# Patient Record
Sex: FEMALE | Race: WHITE | NOT HISPANIC OR LATINO | Employment: FULL TIME | URBAN - METROPOLITAN AREA
[De-identification: names, ages, dates, MRNs, and addresses within clinical notes are randomized per-mention and may not be internally consistent; named-entity substitution may affect disease eponyms.]

---

## 2017-01-03 ENCOUNTER — TELEPHONE (OUTPATIENT)
Dept: SURGERY | Facility: CLINIC | Age: 42
End: 2017-01-03

## 2017-01-03 ENCOUNTER — OFFICE VISIT (OUTPATIENT)
Dept: SURGERY | Facility: CLINIC | Age: 42
End: 2017-01-03
Payer: COMMERCIAL

## 2017-01-03 VITALS — TEMPERATURE: 97.3 F | HEIGHT: 61 IN | WEIGHT: 161 LBS | BODY MASS INDEX: 30.4 KG/M2

## 2017-01-03 DIAGNOSIS — R10.11 RUQ PAIN: ICD-10-CM

## 2017-01-03 DIAGNOSIS — E66.9 ADIPOSITY: ICD-10-CM

## 2017-01-03 DIAGNOSIS — K42.9 UMBILICAL HERNIA WITHOUT OBSTRUCTION AND WITHOUT GANGRENE: Primary | ICD-10-CM

## 2017-01-03 DIAGNOSIS — Z83.79 FAMILY HISTORY OF GALLBLADDER DISEASE: ICD-10-CM

## 2017-01-03 PROCEDURE — 99203 OFFICE O/P NEW LOW 30 MIN: CPT | Performed by: SURGERY

## 2017-01-03 NOTE — TELEPHONE ENCOUNTER
Surgery Scheduled    Date of Surgery 03/16/17 Time of Surgery 1:00pm  Procedure: Umbilical hernia repair with mesh plug  Hospital/Surgical Facility: [facility]  Surgeon: Dr. Salgado  Type of Anesthesia : Anesthesia Choice MAC versus general versus spinal  Pre-op 03/06/17 with Dr. Griffin  Post op:04/03/17 with Dr. Salgado        Surgery packet mailed to patient's home address. Patients instructed to arrive 1 hours prior to surgery. Patient understood and agrees to plan.    Hien Pittman  Surgery Scheduler

## 2017-01-03 NOTE — PROGRESS NOTES
[facility] Surgery Consultation    Kristina K Riedel MRN# [id number]   Age: 41 year old YOB: 1975     Date of consultation: 1/3/2017    Reason for consult: Abdominal pain  Umbilical hernia       Requesting physician: Tamara Griffin                       Chief Complaint:   Abdominal pain, right upper quadrant  Umbilical hernia     History is obtained from the patient and review of the chart         History of Present Illness:   This patient is a 41 year old  female with past medical history as noted below, who presents with complaints of a slowly enlarging and progressively more painful umbilical hernia. She also has associated right upper quadrant pain. She feels like she can detect her intestine moving under her rib cage at times. She thinks this is associated with her umbilical hernia. It also seems to be aggravated by eating. There is a family history of gallbladder disease. The patient has not had a prior right upper quadrant ultrasound. She denies nausea or vomiting but does complain of bloating, gassiness and indigestion.    Umbilical hernia:     The bulge near the umbilicus is associated with intermittent tenderness.  Worsening in severity.  She has not had umbilical hernia repair before in the past.  This hernia is reducible.                Past Medical History:     Past Medical History   Diagnosis Date     NVD (normal vaginal delivery)      x3             Past Surgical History:     Past Surgical History   Procedure Laterality Date     Hc tooth extraction w/forcep       [place] tooth extraction             Social History:     Social History   Substance Use Topics     Smoking status: Never Smoker      Smokeless tobacco: Never Used     Alcohol Use: Yes      Comment: once a week 2-3 glasses wine             Family History:     Family History   Problem Relation Age of Onset     Thyroid Disease Paternal Grandmother      Hypertension Father               "Immunizations:     VACCINE/DOSE   Diptheria   DPT   DTAP   HBIG   Hepatitis A   Hepatitis B   HIB   Influenza   Measles   Meningococcal   MMR   Mumps   Pneumococcal   Polio   Rubella   Small Pox   TDAP   Varicella   Zoster             Allergies:   No Known Allergies          Medications:     Current Outpatient Prescriptions   Medication Sig     VITAMIN D PO Take 600 Units by mouth.     Calcium Carbonate-Vitamin D (CALCIUM 500 + D PO) Take  by mouth.     MULTI-VITAMIN/IRON OR TABS 1 tablet daily     ZINC ACETATE (ORAL) OR 1 CAPSULE 3 TIMES DAILY ON EMPTY STOMACH     OMEGA 3 120-180 MG OR CAPS None Entered     No current facility-administered medications for this visit.             Review of Systems:   C: NEGATIVE for fever, chills, change in weight  E/M: NEGATIVE for ear, mouth and throat problems  R: NEGATIVE for significant cough or SOB  CV: NEGATIVE for chest pain, palpitations or peripheral edema  GI: As per history of present illness above          Physical Exam:   All vitals have been reviewed  Patient Vitals for the past 24 hrs:   Temp Temp src Height Weight   01/03/17 0924 97.3  F (36.3  C) Skin 5' 1\" (1.549 m) 161 lb (73.029 kg)         General: Alert and oriented in no obvious distress  Eyes: No scleral icterus  Skin: Warm and dry without diaphoresis or jaundice  Respiratory: Unlabored  Abdomen: Obese, soft without tenderness in right upper quadrant at present. There is a small reducible umbilical hernia. The base of the defect is probably 1.5 cm. There are no overlying skin changes. There are no other masses. There is no guarding rebound or rigidity.  Musculoskeletal: Patient noted to move all extremities normally  Psychiatric: No obvious changes in mood or affect               Data:   All laboratory data reviewed  There is no pertinent lab or imaging data to review.              Assessment and Plan:   Assessment: Small reducible umbilical hernia. Patient is also interested in liposuction  simultaneously " with her umbilical hernia repair and she is aware this is not available on site at [facility]. She does request referral to investigate possibility of combined surgery. She is given Dr. De La Cruz's phone number at [place] [phone]. She could also have her umbilical hernia repaired by Maria Guadalupe Fuentes, Jessica or Jadon.     Patient Active Problem List    Diagnosis Date Noted     Seasonal allergic rhinitis 05/18/2011     CARDIOVASCULAR SCREENING; LDL GOAL LESS THAN 160 10/31/2010           Plan:    Should the patient proceed with umbilical hernia repair only. We will give her date here at [facility].     The patient was thoroughly counseled regarding her Umbilical hernia without obstruction and without gangrene [K42.9].     The patient was informed that the proposed procedure or medical intervention involves reduction and repair with or without mesh and does offer a very good likelihood of symptom relief.     The patient was made aware of the risks of the procedure, including but not limited to: persistence of pain, injury to the bowel/bladder, hematoma, mesh migration, mesh infection, cardiac or pulmonary complication and anesthesia related complications also that difficulties may be encountered during recovery to include: wound infection, recurrence, seroma, hematoma and chronic pain.     In the course of the evaluation we did discuss other therapeutic options with the patient, including continued watchful waiting. The risks and benefits of these options were also discussed which include but are not limited to: incarceration and/or strangulation..     Also discussed were possible problems or difficulties the patient may encounter if treatment was not pursued at this time.     The patient was informed that Amanda Salgado MD will be primarily responsible for the procedure. Assistance during the procedure and during hospitalization may also be provided by other physicians, nurses and technicians.     The  patient will be provided additional education resources by the support staff. If there are ever any questions regarding their diagnosis or the procedure, the patient is encouraged to ask.     All of the patient s or their legal representative s questions have been answered to their satisfaction and they have indicated a clear understanding of this discussion.   Laya expressed understanding of risks, benefits and alternatives and wished to proceed.     All findings, test results, and diagnosis were discussed with the patient. Laya  participated in the decision making process and agreed with the plan of care. Questions were sought and answered.     Because of her family history of gallbladder disease, I did recommend obtaining a right upper quadrant ultrasound to see if her symptoms were not related to gallstones. Subsequent laparoscopic cholecystectomy would be made more difficult by presence of the mesh and the abdominal wall. Her right upper quadrant symptoms do not seem entirely consistent with her umbilical hernia etiology.             Attestation:  I have reviewed today's vital signs, notes, medications, labs and imaging.  Amount of time performed on this consult: 30 minutes of which greater than 50% was spent in counseling and coordination of care.    Amanda Salgado MD         Please schedule for surgery, pre op H&P, and post ops.    Surgery:  Patient Name:  Kristina K Riedel ([id number])  Procedure:   Umbilical hernia repair with mesh plug  Diagnosis:    Umbilical hernia   Assistant: Single scrub tech  Surgeon:  Amanda Salgado MD  Anesthesia:  Anesthesia choice MAC versus general versus spinal  PT type:  Same Day Surgery  Time needed: 60 minutes  Patient position:  lying supine  Mini fluoro:  No  C-arm:  no  Equipment:  Mesh plug such as Bard ventralex  Anticoagulation:  No  Vendor:  no  Surgeon Notes: Small umbilical hernia. May choose to cancel surgery if he decides to pursue  liposuction.    Post op appts:    12-15 days po    Expected work restrictions:  10 pound lifting, pulling, pushing restriction for 4 weeks postop    [facility] Discussed:  NO

## 2017-01-03 NOTE — NURSING NOTE
Patient prefers to go to [facility] for imaging as her sister works there. Writer gave patient the order so she can schedule and instructed her to bring back the imaging on the disk as well as the report for Dr. Salgado to go over. Patient verbalized understanding and had no questions.  Giovanni ESTRADA CMA

## 2017-01-03 NOTE — NURSING NOTE
"Chief Complaint   Patient presents with     Consult     hernia       Initial Temp(Src) 97.3  F (36.3  C) (Skin)  Ht 5' 1\" (1.549 m)  Wt 161 lb (73.029 kg)  BMI 30.44 kg/m2 Estimated body mass index is 30.44 kg/(m^2) as calculated from the following:    Height as of this encounter: 5' 1\" (1.549 m).    Weight as of this encounter: 161 lb (73.029 kg).  BP completed using cuff size: NA (Not Taken)  Giovanni ESTRADA CMA      "

## 2017-01-03 NOTE — MR AVS SNAPSHOT
After Visit Summary   1/3/2017    Kristina K Riedel    MRN: [id number]           Patient Information     Date Of Birth          1975        Visit Information        Provider Department      1/3/2017 9:30 AM Amanda Salgado MD [facility]        Today's Diagnoses     Umbilical hernia without obstruction and without gangrene    -  1     Adiposity            Follow-ups after your visit        Additional Services     PLASTIC SURGERY REFERRAL       Your provider has referred you to: [facility]: Plastic and Reconstructive Surgery [facility] [phone]   [email or web address]  Patient interested in liposuction.    Please be aware that coverage of these services is subject to the terms and limitations of your health insurance plan.  Call member services at your health plan with any benefit or coverage questions.      Please bring the following with you to your appointment:    (1) Any X-Rays, CTs or MRIs which have been performed.  Contact the facility where they were done to arrange for  prior to your scheduled appointment.    (2) List of current medications  (3) This referral request   (4) Any documents/labs given to you for this referral                  Who to contact     If you have questions or need follow up information about today's clinic visit or your schedule please contact [facility] directly at [phone].  Normal or non-critical lab and imaging results will be communicated to you by MyChart, letter or phone within 4 business days after the clinic has received the results. If you do not hear from us within 7 days, please contact the clinic through MyChart or phone. If you have a critical or abnormal lab result, we will notify you by phone as soon as possible.  Submit refill requests through [employer] or call your pharmacy and they will forward the refill request to us. Please  "allow 3 business days for your refill to be completed.          Additional Information About Your Visit        [employer] Information     [employer] gives you secure access to your electronic health record. If you see a primary care provider, you can also send messages to your care team and make appointments. If you have questions, please call your primary care clinic.  If you do not have a primary care provider, please call [phone] and they will assist you.        Your Vitals Were     Temperature Height BMI (Body Mass Index)             97.3  F (36.3  C) (Skin) 5' 1\" (1.549 m) 30.44 kg/m2          Blood Pressure from Last 3 Encounters:   07/01/15 110/70   06/03/14 102/72   11/07/12 109/72    Weight from Last 3 Encounters:   01/03/17 161 lb (73.029 kg)   07/01/15 145 lb 1.9 oz (65.826 kg)   06/03/14 143 lb (64.864 kg)              We Performed the Following     PLASTIC SURGERY REFERRAL        Primary Care Provider Office Phone # Fax #    Tamara Gilma Griffin [phone]       [facility] [address] [place] [address]        Thank you!     Thank you for choosing [facility]  for your care. Our goal is always to provide you with excellent care. Hearing back from our patients is one way we can continue to improve our services. Please take a few minutes to complete the written survey that you may receive in the mail after your visit with us. Thank you!             Your Updated Medication List - Protect others around you: Learn how to safely use, store and throw away your medicines at www.disposemymeds.org.          This list is accurate as of: 1/3/17  9:42 AM.  Always use your most recent med list.                   Brand Name Dispense Instructions for use    CALCIUM 500 + D PO      Take  by mouth.       MULTI-vitamin  /IRON Tabs      1 tablet daily       OMEGA 3 120-180 MG Caps      None Entered       VITAMIN D PO      Take 600 Units by mouth.       ZINC ACETATE " (ORAL) PO      1 CAPSULE 3 TIMES DAILY ON EMPTY STOMACH

## 2017-01-05 ENCOUNTER — TELEPHONE (OUTPATIENT)
Dept: SURGERY | Facility: CLINIC | Age: 42
End: 2017-01-05

## 2017-01-05 NOTE — TELEPHONE ENCOUNTER
Patient had questions regarding having hernia repair surgery done at the same time as liposuction. Told her the general prices and told her a consult would be needed.

## 2017-01-06 ENCOUNTER — TRANSFERRED RECORDS (OUTPATIENT)
Dept: HEALTH INFORMATION MANAGEMENT | Facility: CLINIC | Age: 42
End: 2017-01-06

## 2017-01-10 ENCOUNTER — TELEPHONE (OUTPATIENT)
Dept: SURGERY | Facility: CLINIC | Age: 42
End: 2017-01-10

## 2017-01-10 NOTE — TELEPHONE ENCOUNTER
Reason for Call:  Other call back    Detailed comments: US results    Phone Number Patient can be reached at: Home number on file [phone] (home)    Best Time: Patient called asking if US results have been received / and if she needs and appointment to review it.  Please advise    Can we leave a detailed message on this number? YES    Call taken on 1/10/2017 at 1:25 PM by Kitty Lamar

## 2017-01-10 NOTE — TELEPHONE ENCOUNTER
Pt had US at [facility] in [place]. She will call them to have them send the results to us today. SUSAN Olson

## 2017-01-13 ENCOUNTER — TELEPHONE (OUTPATIENT)
Dept: SURGERY | Facility: CLINIC | Age: 42
End: 2017-01-13

## 2017-01-13 NOTE — TELEPHONE ENCOUNTER
Our goal is to have forms completed with 72 hours, however some forms may require a visit or additional information.    Who is the form from?: Insurance comp  Where the form came from: form was faxed in  What clinic location was the form placed at?: [place]  Where the form was placed: '[facility]  What number is listed as a contact on the form?: [phone]    Phone call message- patient request for a letter, form or note:    Date needed: at your convenience  Please fax to [phone]  Has the patient signed a consent form for release of information? YES    Additional comments:     Call taken on 1/13/2017 at 4:24 PM by Kitty Lamar    Type of letter, form or note: disability

## 2017-02-17 NOTE — TELEPHONE ENCOUNTER
Patient called regarding Dr. Salgado on leave and her upcoming 3/30 surgery. Patient would like to remain on Dr. Lance schedule in hopes she returns prior to surgery date.  If not she is interested in scheduling with Dr. Magan Conley.  She said she will check in frequently and I informed we will call her with any updates also.

## 2017-03-13 ENCOUNTER — OFFICE VISIT (OUTPATIENT)
Dept: FAMILY MEDICINE | Facility: CLINIC | Age: 42
End: 2017-03-13
Payer: COMMERCIAL

## 2017-03-13 VITALS
HEIGHT: 61 IN | HEART RATE: 94 BPM | BODY MASS INDEX: 31.06 KG/M2 | DIASTOLIC BLOOD PRESSURE: 82 MMHG | SYSTOLIC BLOOD PRESSURE: 126 MMHG | WEIGHT: 164.5 LBS | TEMPERATURE: 97.1 F | OXYGEN SATURATION: 100 %

## 2017-03-13 DIAGNOSIS — Z00.00 ROUTINE GENERAL MEDICAL EXAMINATION AT A HEALTH CARE FACILITY: Primary | ICD-10-CM

## 2017-03-13 DIAGNOSIS — Z13.6 CARDIOVASCULAR SCREENING; LDL GOAL LESS THAN 160: ICD-10-CM

## 2017-03-13 DIAGNOSIS — K42.9 UMBILICAL HERNIA WITHOUT OBSTRUCTION AND WITHOUT GANGRENE: ICD-10-CM

## 2017-03-13 DIAGNOSIS — Z83.49 FAMILY HISTORY OF THYROID DISEASE: ICD-10-CM

## 2017-03-13 DIAGNOSIS — Z01.818 PREOP GENERAL PHYSICAL EXAM: ICD-10-CM

## 2017-03-13 PROCEDURE — 99396 PREV VISIT EST AGE 40-64: CPT | Performed by: FAMILY MEDICINE

## 2017-03-13 RX ORDER — VITAMIN E 268 MG
400 CAPSULE ORAL DAILY
COMMUNITY

## 2017-03-13 ASSESSMENT — PAIN SCALES - GENERAL: PAINLEVEL: NO PAIN (0)

## 2017-03-13 NOTE — PROGRESS NOTES
[address] [address]  [phone]  Dept: [phone]    Laya is here today for routine preventive exam.  She also needs pre-op clearance for an upcoming hernia repair surgery.   She has no concerns today. She is feeling well.      Preop Evaluation  Today's date: 3/13/2017    Kristina K Riedel (: 1975) presents for a preventative exam as requested by Dr. Amanda Salgado.  She requires evaluation and anesthesia risk assessment prior to undergoing surgery/procedure for treatment of umbilical hernia .  Proposed procedure: Umbilical Herniorrhaphy with possible mesh plug.     Date of Surgery/ Procedure: 3/30/2017  Time of Surgery/ Procedure: 12:00pm  Hospital/Surgical Facility: [facility]  Primary Physician: Tamara Griffin  Type of Anesthesia Anticipated: to be determined    Patient has a Health Care Directive or Living Will:  YES On File    1. NO - Do you have a history of heart attack, stroke, stent, bypass or surgery on an artery in the head, neck, heart or legs?  2. NO - Do you ever have any pain or discomfort in your chest?  3. NO - Do you have a history of  Heart Failure?  4. NO - Are you troubled by shortness of breath when: walking on the level, up a slight hill or at night?  5. NO - Do you currently have a cold, bronchitis or other respiratory infection?  6. NO - Do you have a cough, shortness of breath or wheezing?  7. NO - Do you sometimes get pains in the calves of your legs when you walk?  8. NO - Do you or anyone in your family have previous history of blood clots?  9. NO - Do you or does anyone in your family have a serious bleeding problem such as prolonged bleeding following surgeries or cuts?  10. NO - Have you ever had problems with anemia or been told to take iron pills?  11. NO - Have you had any abnormal blood loss such as black, tarry or bloody stools, or abnormal vaginal bleeding?  12. NO - Have you ever had a blood  "transfusion?  13. NO - Have you or any of your relatives ever had problems with anesthesia?  14. NO - Do you have sleep apnea, excessive snoring or daytime drowsiness?  15. NO - Do you have any prosthetic heart valves?  16. NO - Do you have prosthetic joints?  17. NO - Is there any chance that you may be pregnant?      HPI:                                                      Brief HPI related to upcoming procedure: S:  Kristina K Riedel is a 41 year old female who presents to the clinic for a preventative exam. She has a Umbilical herniorrhaphy scheduled to treat an umbilical hernia, on 3/30 with Dr. Amanda Salgado. Patient last saw Dr. Salgado on 1/3/2017, who provided a history of her hernia:    \"This patient is a 41 year old  female with past medical history as noted below, who presents with complaints of a slowly enlarging and progressively more painful umbilical hernia. She also has associated right upper quadrant pain. She feels like she can detect her intestine moving under her rib cage at times. She thinks this is associated with her umbilical hernia. It also seems to be aggravated by eating. There is a family history of gallbladder disease. The patient has not had a prior right upper quadrant ultrasound. She denies nausea or vomiting but does complain of bloating, gassiness and indigestion.\"    See problem list for active medical problems.  Problems all longstanding and stable, except as noted/documented.  See ROS for pertinent symptoms related to these conditions.                                                                                                  .    MEDICAL HISTORY:                                                      Patient Active Problem List    Diagnosis Date Noted     Seasonal allergic rhinitis 05/18/2011     CARDIOVASCULAR SCREENING; LDL GOAL LESS THAN 160 10/31/2010      Past Medical History   Diagnosis Date     NVD (normal vaginal delivery)      x3     Past Surgical " "History   Procedure Laterality Date     Hc tooth extraction w/forcep       [place] tooth extraction     Current Outpatient Prescriptions   Medication Sig Dispense Refill     vitamin E 400 UNIT capsule Take 400 Units by mouth daily       VITAMIN D PO Take 600 Units by mouth.       Calcium Carbonate-Vitamin D (CALCIUM 500 + D PO) Take  by mouth.       ZINC ACETATE (ORAL) OR 1 CAPSULE 3 TIMES DAILY ON EMPTY STOMACH       MULTI-VITAMIN/IRON OR TABS 1 tablet daily       OMEGA 3 120-180 MG OR CAPS None Entered       OTC products: None, except as noted above    No Known Allergies   Latex Allergy: NO    Social History   Substance Use Topics     Smoking status: Never Smoker     Smokeless tobacco: Never Used     Alcohol use Yes      Comment: once a week 2-3 glasses wine     History   Drug Use No       REVIEW OF SYSTEMS:                                                    C: NEGATIVE for fever, chills, change in weight  I: NEGATIVE for worrisome rashes, moles or lesions  E: NEGATIVE for vision changes or irritation  E/M: NEGATIVE for ear, mouth and throat problems  R: NEGATIVE for significant cough or SOB  B: NEGATIVE for masses, tenderness or discharge  CV: NEGATIVE for chest pain, palpitations or peripheral edema  GI: NEGATIVE for nausea, abdominal pain, heartburn, or change in bowel habits  : NEGATIVE for frequency, dysuria, or hematuria  M: NEGATIVE for significant arthralgias or myalgia  N: NEGATIVE for weakness, dizziness or paresthesias  E: Positive for FHx of Thyroid issues. NEGATIVE for temperature intolerance, skin/hair changes  H: NEGATIVE for bleeding problems  P: NEGATIVE for changes in mood or affect    EXAM:                                                    /82 (BP Location: Right arm, Patient Position: Chair, Cuff Size: Adult Regular)  Pulse 94  Temp 97.1  F (36.2  C) (Temporal)  Ht 5' 0.5\" (1.537 m)  Wt 164 lb 8 oz (74.6 kg)  SpO2 100%  BMI 31.6 kg/m2    GENERAL APPEARANCE: healthy, alert and no " distress     EYES: EOMI, PERRL     HENT: ear canals and TM's normal and nose and mouth without ulcers or lesions     NECK: no adenopathy, no asymmetry, masses, or scars and thyroid normal to palpation     RESP: lungs clear to auscultation - no rales, rhonchi or wheezes     CV: regular rates and rhythm, normal S1 S2, no S3 or S4 and no murmur, click or rub     ABDOMEN: Small umbilical hernia noted on exam. There is a palpable defect in the wall, 3 cm across, just around the umbilicus. soft, nontender, no HSM and bowel sounds normal     MS: extremities normal- no gross deformities noted, no evidence of inflammation in joints, FROM in all extremities.     SKIN: no suspicious lesions or rashes     NEURO: Normal strength and tone, sensory exam grossly normal, mentation intact and speech normal     PSYCH: mentation appears normal. and affect normal/bright     LYMPHATICS: No cervical or supraclavicular nodes    DIAGNOSTICS:                                                      Orders Placed This Encounter   Procedures     TSH with free T4 reflex     Lipid Profile with reflex to direct LDL     IMPRESSION:                                                      Reason for surgery/procedure: Umbilical Hernia  Diagnosis/reason for consult: Preventative Exam, Umbilical Herniorrhaphy      ICD-10-CM    1. Routine general medical examination at a health care facility Z00.00    2. Preop general physical exam Z01.818    3. Umbilical hernia without obstruction and without gangrene K42.9    4. CARDIOVASCULAR SCREENING; LDL GOAL LESS THAN 160 Z13.6 Lipid Profile with reflex to direct LDL   5. Family history of thyroid disease Z83.49 TSH with free T4 reflex     The proposed surgical procedure is considered INTERMEDIATE risk.    REVISED CARDIAC RISK INDEX  The patient has the following serious cardiovascular risks for perioperative complications such as (MI, PE, VFib and 3  AV Block):  No serious cardiac risks  INTERPRETATION: 0 risks: Class  I (very low risk - 0.4% complication rate)    The patient has the following additional risks for perioperative complications:  No identified additional risks    RECOMMENDATIONS:                                                        APPROVAL GIVEN to proceed with proposed procedure, without further diagnostic evaluation    - Labs drawn, will notify with results and discuss further evaluation/ treatment if needed at that time.   - Patient deferred her PAP to her visit next year at her request.  Future lab orders placed for screening.        Signed Electronically by: Tamara Griffin MD    Copy of this evaluation report is provided to requesting physician.    [facility] Preop Guidelines

## 2017-03-13 NOTE — MR AVS SNAPSHOT
After Visit Summary   3/13/2017    Kristina K Riedel    MRN: [id number]           Patient Information     Date Of Birth          1975        Visit Information        Provider Department      3/13/2017 2:15 PM Tamara Griffin MD [facility]        Today's Diagnoses     Routine general medical examination at a health care facility    -  1    Preop general physical exam        Umbilical hernia without obstruction and without gangrene        CARDIOVASCULAR SCREENING; LDL GOAL LESS THAN 160        Family history of thyroid disease          Care Instructions      Before Your Surgery      Call your surgeon if there is any change in your health. This includes signs of a cold or flu (such as a sore throat, runny nose, cough, rash or fever).    Do not smoke, drink alcohol or take over the counter medicine (unless your surgeon or primary care doctor tells you to) for the 24 hours before and after surgery.    If you take prescribed drugs: Follow your doctor s orders about which medicines to take and which to stop until after surgery.    Eating and drinking prior to surgery: follow the instructions from your surgeon    Take a shower or bath the night before surgery. Use the soap your surgeon gave you to gently clean your skin. If you do not have soap from your surgeon, use your regular soap. Do not shave or scrub the surgery site.  Wear clean pajamas and have clean sheets on your bed.         Follow-ups after your visit        Your next 10 appointments already scheduled     Mar 22, 2017  3:00 PM CDT   Return Visit with Ines Conley MD   [facility] ([facility]    [address] [place] [id number] [phone]              Who to contact     If you have questions or need follow up information about today's clinic visit or your schedule please contact [facility] directly at [phone].  Normal or non-critical lab and  "imaging results will be communicated to you by MyChart, letter or phone within 4 business days after the clinic has received the results. If you do not hear from us within 7 days, please contact the clinic through [employer] or phone. If you have a critical or abnormal lab result, we will notify you by phone as soon as possible.  Submit refill requests through [employer] or call your pharmacy and they will forward the refill request to us. Please allow 3 business days for your refill to be completed.          Additional Information About Your Visit        [facility] Information     [employer] gives you secure access to your electronic health record. If you see a primary care provider, you can also send messages to your care team and make appointments. If you have questions, please call your primary care clinic.  If you do not have a primary care provider, please call [phone] and they will assist you.        Care EveryWhere ID     This is your Care EveryWhere ID. This could be used by other organizations to access your [place] medical records  [id number]        Your Vitals Were     Pulse Temperature Height Pulse Oximetry BMI (Body Mass Index)       94 97.1  F (36.2  C) (Temporal) 5' 0.5\" (1.537 m) 100% 31.6 kg/m2        Blood Pressure from Last 3 Encounters:   03/13/17 126/82   07/01/15 110/70   06/03/14 102/72    Weight from Last 3 Encounters:   03/13/17 164 lb 8 oz (74.6 kg)   01/03/17 161 lb (73 kg)   07/01/15 145 lb 1.9 oz (65.8 kg)               Primary Care Provider Office Phone # Fax #    Tamara Gilma Griffin [phone]       [facility] [address] [place] [address]        Thank you!     Thank you for choosing [facility]  for your care. Our goal is always to provide you with excellent care. Hearing back from our patients is one way we can continue to improve our services. Please take a few minutes to complete the written survey that you may receive in the " mail after your visit with us. Thank you!             Your Updated Medication List - Protect others around you: Learn how to safely use, store and throw away your medicines at www.disposemymeds.org.          This list is accurate as of: 3/13/17 11:59 PM.  Always use your most recent med list.                   Brand Name Dispense Instructions for use    CALCIUM 500 + D PO      Take  by mouth.       MULTI-vitamin  /IRON Tabs      1 tablet daily       OMEGA 3 120-180 MG Caps      None Entered       VITAMIN D PO      Take 600 Units by mouth.       vitamin E 400 UNIT capsule      Take 400 Units by mouth daily       ZINC ACETATE (ORAL) PO      1 CAPSULE 3 TIMES DAILY ON EMPTY STOMACH

## 2017-03-13 NOTE — TELEPHONE ENCOUNTER
Left message for patient to return call. Dr. Salgado will not be back on time to do this case so patient needs to schedule a consult with another physician.

## 2017-03-13 NOTE — TELEPHONE ENCOUNTER
Spoke to patient today and notified her that Dr. Salgado would not be back on time for her surgery. Patient has been scheduled for a consult with Dr. Conley. Patient upset about having to have another consult and a bill for it. I forwarded patients information on to Martha James so she could review.

## 2017-03-13 NOTE — NURSING NOTE
"Chief Complaint   Patient presents with     Pre-Op Exam       Initial /82 (BP Location: Right arm, Patient Position: Chair, Cuff Size: Adult Regular)  Pulse 94  Temp 97.1  F (36.2  C) (Temporal)  Ht 5' 0.5\" (1.537 m)  Wt 164 lb 8 oz (74.6 kg)  SpO2 100%  BMI 31.6 kg/m2 Estimated body mass index is 31.6 kg/(m^2) as calculated from the following:    Height as of this encounter: 5' 0.5\" (1.537 m).    Weight as of this encounter: 164 lb 8 oz (74.6 kg).  Medication Reconciliation: complete  Meri Garcia MA  "

## 2017-03-14 DIAGNOSIS — Z13.6 CARDIOVASCULAR SCREENING; LDL GOAL LESS THAN 160: ICD-10-CM

## 2017-03-14 DIAGNOSIS — Z83.49 FAMILY HISTORY OF THYROID DISEASE: ICD-10-CM

## 2017-03-14 LAB
CHOLEST SERPL-MCNC: 190 MG/DL
HDLC SERPL-MCNC: 62 MG/DL
LDLC SERPL CALC-MCNC: 94 MG/DL
NONHDLC SERPL-MCNC: 128 MG/DL
TRIGL SERPL-MCNC: 172 MG/DL
TSH SERPL DL<=0.005 MIU/L-ACNC: 3.29 MU/L (ref 0.4–4)

## 2017-03-14 PROCEDURE — 36415 COLL VENOUS BLD VENIPUNCTURE: CPT | Performed by: FAMILY MEDICINE

## 2017-03-14 PROCEDURE — 80061 LIPID PANEL: CPT | Performed by: FAMILY MEDICINE

## 2017-03-14 PROCEDURE — 84443 ASSAY THYROID STIM HORMONE: CPT | Performed by: FAMILY MEDICINE

## 2017-03-17 PROBLEM — K42.9 UMBILICAL HERNIA WITHOUT OBSTRUCTION AND WITHOUT GANGRENE: Status: ACTIVE | Noted: 2017-03-17

## 2017-03-17 NOTE — PROGRESS NOTES
Laya, these are good. Your thyroid is normal and your cholesterol is fine for a nonfasting test.   Tamara Griffin MD

## 2017-03-22 ENCOUNTER — OFFICE VISIT (OUTPATIENT)
Dept: SURGERY | Facility: CLINIC | Age: 42
End: 2017-03-22
Payer: COMMERCIAL

## 2017-03-22 ENCOUNTER — TELEPHONE (OUTPATIENT)
Dept: SURGERY | Facility: CLINIC | Age: 42
End: 2017-03-22

## 2017-03-22 VITALS — DIASTOLIC BLOOD PRESSURE: 68 MMHG | TEMPERATURE: 97.9 F | HEART RATE: 52 BPM | SYSTOLIC BLOOD PRESSURE: 124 MMHG

## 2017-03-22 DIAGNOSIS — K42.9 UMBILICAL HERNIA WITHOUT OBSTRUCTION AND WITHOUT GANGRENE: Primary | ICD-10-CM

## 2017-03-22 PROCEDURE — 99214 OFFICE O/P EST MOD 30 MIN: CPT | Performed by: SURGERY

## 2017-03-22 RX ORDER — CEFAZOLIN SODIUM 1 G/3ML
1 INJECTION, POWDER, FOR SOLUTION INTRAMUSCULAR; INTRAVENOUS SEE ADMIN INSTRUCTIONS
Status: CANCELLED | OUTPATIENT
Start: 2017-03-22

## 2017-03-22 RX ORDER — CEFAZOLIN SODIUM 2 G/100ML
2 INJECTION, SOLUTION INTRAVENOUS
Status: CANCELLED | OUTPATIENT
Start: 2017-03-22

## 2017-03-22 RX ORDER — ACETAMINOPHEN 325 MG/1
975 TABLET ORAL ONCE
Status: CANCELLED | OUTPATIENT
Start: 2017-03-22 | End: 2017-03-22

## 2017-03-22 NOTE — TELEPHONE ENCOUNTER
Reason for Call:  Other call back    Detailed comments: Dr. Conley patient, would like to schedule her surgery -     Phone Number Patient can be reached at: Home number on file [phone] (home)    Best Time: today please     Can we leave a detailed message on this number? Not Applicable    Call taken on 3/22/2017 at 3:31 PM by Jacquelin Veras

## 2017-03-22 NOTE — PROGRESS NOTES
Assessment:  Primary, reducible umbilical hernia.    Plan:      We have discussed open umbilical hernia repair +/- mesh in detail, including benefits, alternatives, complications, incision, scar, mesh, infection, anesthesia, bleeding, blood transfusion, DVT, PE, hernia recurrence, lifting and activity limits after surgery.  All questions have been answered to the best of my ability.    She has been given literature to review.   We will schedule surgery at the patient's convenience.      HPI:  Laya is a 41 year old female who presents for evaluation of a lump in the umbilical.  She first noticed it 1 year ago.  She complains of intermittent pain with bending.  Negative for associated symptoms of nausea, vomiting, diarrhea and constipation.  She has not had a previous herniorrhaphy in this location.  Her employment does require heavy lifting. She was scheduled for surgery with Damian who is unavailable to do surgery.    Constipation: No  Colonoscopy: No   Cough: No  Diabetes: No  Current Smoker: No    Past Medical History:   has a past medical history of NVD (normal vaginal delivery).    Past Surgical History:  Past Surgical History:   Procedure Laterality Date     HC TOOTH EXTRACTION W/FORCEP      [place] tooth extraction      Additional abdominal operations: none    Social History:  Social History     Social History     Marital status:      Spouse name: Eddie     Number of children: 3     Years of education: N/A     Occupational History      [employer]     Social History Main Topics     Smoking status: Never Smoker     Smokeless tobacco: Never Used     Alcohol use Yes      Comment: once a week 2-3 glasses wine     Drug use: No     Sexual activity: Yes     Partners: Male     Birth control/ protection: Surgical      Comment: vasectomy     Other Topics Concern      Service No     Blood Transfusions No     Caffeine Concern No     Occupational Exposure No     Hobby Hazards No     Sleep Concern  No     Stress Concern No     Weight Concern No     Special Diet No     Back Care No     Exercise Yes     Walks and arobics     Bike Helmet Yes     Seat Belt Yes     Self-Exams Yes     Social History Narrative        Family History:  Family History   Problem Relation Age of Onset     Hypertension Father      Thyroid Disease Paternal Grandmother      Hernias: No    ROS:  The 10 point review of systems is negative other than noted in the HPI and above.    PE:    /68 (BP Location: Right arm, Patient Position: Chair, Cuff Size: Adult Regular)  Pulse 52  Temp 97.9  F (36.6  C) (Skin)  General - Well developed, well nourished female in no apparent distress  Heart: Regular rate and rhythm, no murmurs  Abdomen:  abdomen is soft without significant tenderness, masses, organomegaly or guarding   Hernia:  umbilical, 1 cm, reducible, mildly tender  Extremities: Warm without edema  Musculoskeletal:  Normal station and gait  Neurologic: Nonfocal  Psychiatric: Mood and affect appropriate  Skin: Without lesions or rashes, or juandice    Time spent with the patient with greater that 50% of the time in discussion was 30 minutes.     Magan Conley MD    Please route or send letter to:  Referring Provider

## 2017-03-22 NOTE — NURSING NOTE
"Chief Complaint   Patient presents with     Consult     umbilical hernia, pre-op done on 03/13/17 by Elias        Initial /68 (BP Location: Right arm, Patient Position: Chair, Cuff Size: Adult Regular)  Pulse 52  Temp 97.9  F (36.6  C) (Skin) Estimated body mass index is 31.6 kg/(m^2) as calculated from the following:    Height as of 3/13/17: 5' 0.5\" (1.537 m).    Weight as of 3/13/17: 164 lb 8 oz (74.6 kg).  Medication Reconciliation: complete   Giovanni ESTRADA CMA      "

## 2017-03-22 NOTE — TELEPHONE ENCOUNTER
Surgery Scheduled    Date of Surgery 4/3/17 Time of Surgery   Procedure: Umbilical hernia repair  Hospital/Surgical Facility: [facility]  Surgeon: Dr. Conley  Type of Anesthesia : General  Pre-op 3/13/17 with Dr. Griffin  Post op:4/21/17 with Dr. Conley        Surgery packet mailed to patient's home address. Patients instructed to arrive 1 hours prior to surgery. Patient understood and agrees to plan.    Hien Pittman  Surgery Scheduler

## 2017-03-22 NOTE — Clinical Note
Request for Elective Surgery to be Scheduled Dr. Magan Conley Please schedule for surgery, pre-op H&P, and post-op follow up. Patient Name:   Kristina K Riedel ([id number]). Diagnosis:  Umbilical hernia without obstruction and without gangrene [K42.9]. Procedures:   Umbilical hernia repair.  Patient type:  Outpatient. Time estimate:  60 min. Assistant:   Surgical Tech Anesthesia:  general anesthesia.     Patient position:  supine.                     Pathology Service Needed Routine. Block type: None. Given by: NA. Location where block to be administered:  NA. C-arm:  No. Special Equipment:  None Vendor to be called by O.R.:   No. Patient on anti-coagulation?:  Yes.             Blood or Blood Products available   No.                  Post op appointment:  3-4 weeks. Expected work restrictions:  10-20 lbs for 2-3 weeks.

## 2017-03-22 NOTE — MR AVS SNAPSHOT
After Visit Summary   3/22/2017    Kristina K Riedel    MRN: [id number]           Patient Information     Date Of Birth          1975        Visit Information        Provider Department      3/22/2017 3:00 PM Ines Conley MD [facility]         Follow-ups after your visit        Who to contact     If you have questions or need follow up information about today's clinic visit or your schedule please contact [facility] directly at [phone].  Normal or non-critical lab and imaging results will be communicated to you by MyChart, letter or phone within 4 business days after the clinic has received the results. If you do not hear from us within 7 days, please contact the clinic through [employer] or phone. If you have a critical or abnormal lab result, we will notify you by phone as soon as possible.  Submit refill requests through [employer] or call your pharmacy and they will forward the refill request to us. Please allow 3 business days for your refill to be completed.          Additional Information About Your Visit        MyChart Information     [employer] gives you secure access to your electronic health record. If you see a primary care provider, you can also send messages to your care team and make appointments. If you have questions, please call your primary care clinic.  If you do not have a primary care provider, please call [phone] and they will assist you.        Care EveryWhere ID     This is your Care EveryWhere ID. This could be used by other organizations to access your [place] medical records  [id number]        Your Vitals Were     Pulse Temperature                52 97.9  F (36.6  C) (Skin)           Blood Pressure from Last 3 Encounters:   03/22/17 124/68   03/13/17 126/82   07/01/15 110/70    Weight from Last 3 Encounters:   03/13/17 164 lb 8 oz (74.6 kg)   01/03/17 161 lb (73 kg)   07/01/15 145 lb 1.9 oz (65.8 kg)              Today, you had the  following     No orders found for display       Primary Care Provider Office Phone # Fax #    Tamara Gilma Griffin [phone]       [facility] [address] [place] [address]        Thank you!     Thank you for choosing [facility]  for your care. Our goal is always to provide you with excellent care. Hearing back from our patients is one way we can continue to improve our services. Please take a few minutes to complete the written survey that you may receive in the mail after your visit with us. Thank you!             Your Updated Medication List - Protect others around you: Learn how to safely use, store and throw away your medicines at www.disposemymeds.org.          This list is accurate as of: 3/22/17  3:25 PM.  Always use your most recent med list.                   Brand Name Dispense Instructions for use    CALCIUM 500 + D PO      Take  by mouth.       MULTI-vitamin  /IRON Tabs      1 tablet daily       OMEGA 3 120-180 MG Caps      None Entered       VITAMIN D PO      Take 600 Units by mouth.       vitamin E 400 UNIT capsule      Take 400 Units by mouth daily       ZINC ACETATE (ORAL) PO      1 CAPSULE 3 TIMES DAILY ON EMPTY STOMACH

## 2017-03-24 ENCOUNTER — TELEPHONE (OUTPATIENT)
Dept: SURGERY | Facility: CLINIC | Age: 42
End: 2017-03-24

## 2017-03-30 NOTE — H&P (VIEW-ONLY)
[address] [address]  [phone]  Dept: [phone]    Laya is here today for routine preventive exam.  She also needs pre-op clearance for an upcoming hernia repair surgery.   She has no concerns today. She is feeling well.      Preop Evaluation  Today's date: 3/13/2017    Kristina K Riedel (: 1975) presents for a preventative exam as requested by Dr. Amanda Salgado.  She requires evaluation and anesthesia risk assessment prior to undergoing surgery/procedure for treatment of umbilical hernia .  Proposed procedure: Umbilical Herniorrhaphy with possible mesh plug.     Date of Surgery/ Procedure: 3/30/2017  Time of Surgery/ Procedure: 12:00pm  Hospital/Surgical Facility: [facility]  Primary Physician: Tamara Griffin  Type of Anesthesia Anticipated: to be determined    Patient has a Health Care Directive or Living Will:  YES On File    1. NO - Do you have a history of heart attack, stroke, stent, bypass or surgery on an artery in the head, neck, heart or legs?  2. NO - Do you ever have any pain or discomfort in your chest?  3. NO - Do you have a history of  Heart Failure?  4. NO - Are you troubled by shortness of breath when: walking on the level, up a slight hill or at night?  5. NO - Do you currently have a cold, bronchitis or other respiratory infection?  6. NO - Do you have a cough, shortness of breath or wheezing?  7. NO - Do you sometimes get pains in the calves of your legs when you walk?  8. NO - Do you or anyone in your family have previous history of blood clots?  9. NO - Do you or does anyone in your family have a serious bleeding problem such as prolonged bleeding following surgeries or cuts?  10. NO - Have you ever had problems with anemia or been told to take iron pills?  11. NO - Have you had any abnormal blood loss such as black, tarry or bloody stools, or abnormal vaginal bleeding?  12. NO - Have you ever had a blood  "transfusion?  13. NO - Have you or any of your relatives ever had problems with anesthesia?  14. NO - Do you have sleep apnea, excessive snoring or daytime drowsiness?  15. NO - Do you have any prosthetic heart valves?  16. NO - Do you have prosthetic joints?  17. NO - Is there any chance that you may be pregnant?      HPI:                                                      Brief HPI related to upcoming procedure: S:  Kristina K Riedel is a 41 year old female who presents to the clinic for a preventative exam. She has a Umbilical herniorrhaphy scheduled to treat an umbilical hernia, on 3/30 with Dr. Amanda Salgado. Patient last saw Dr. Salgado on 1/3/2017, who provided a history of her hernia:    \"This patient is a 41 year old  female with past medical history as noted below, who presents with complaints of a slowly enlarging and progressively more painful umbilical hernia. She also has associated right upper quadrant pain. She feels like she can detect her intestine moving under her rib cage at times. She thinks this is associated with her umbilical hernia. It also seems to be aggravated by eating. There is a family history of gallbladder disease. The patient has not had a prior right upper quadrant ultrasound. She denies nausea or vomiting but does complain of bloating, gassiness and indigestion.\"    See problem list for active medical problems.  Problems all longstanding and stable, except as noted/documented.  See ROS for pertinent symptoms related to these conditions.                                                                                                  .    MEDICAL HISTORY:                                                      Patient Active Problem List    Diagnosis Date Noted     Seasonal allergic rhinitis 05/18/2011     CARDIOVASCULAR SCREENING; LDL GOAL LESS THAN 160 10/31/2010      Past Medical History   Diagnosis Date     NVD (normal vaginal delivery)      x3     Past Surgical " "History   Procedure Laterality Date     Hc tooth extraction w/forcep       [place] tooth extraction     Current Outpatient Prescriptions   Medication Sig Dispense Refill     vitamin E 400 UNIT capsule Take 400 Units by mouth daily       VITAMIN D PO Take 600 Units by mouth.       Calcium Carbonate-Vitamin D (CALCIUM 500 + D PO) Take  by mouth.       ZINC ACETATE (ORAL) OR 1 CAPSULE 3 TIMES DAILY ON EMPTY STOMACH       MULTI-VITAMIN/IRON OR TABS 1 tablet daily       OMEGA 3 120-180 MG OR CAPS None Entered       OTC products: None, except as noted above    No Known Allergies   Latex Allergy: NO    Social History   Substance Use Topics     Smoking status: Never Smoker     Smokeless tobacco: Never Used     Alcohol use Yes      Comment: once a week 2-3 glasses wine     History   Drug Use No       REVIEW OF SYSTEMS:                                                    C: NEGATIVE for fever, chills, change in weight  I: NEGATIVE for worrisome rashes, moles or lesions  E: NEGATIVE for vision changes or irritation  E/M: NEGATIVE for ear, mouth and throat problems  R: NEGATIVE for significant cough or SOB  B: NEGATIVE for masses, tenderness or discharge  CV: NEGATIVE for chest pain, palpitations or peripheral edema  GI: NEGATIVE for nausea, abdominal pain, heartburn, or change in bowel habits  : NEGATIVE for frequency, dysuria, or hematuria  M: NEGATIVE for significant arthralgias or myalgia  N: NEGATIVE for weakness, dizziness or paresthesias  E: Positive for FHx of Thyroid issues. NEGATIVE for temperature intolerance, skin/hair changes  H: NEGATIVE for bleeding problems  P: NEGATIVE for changes in mood or affect    EXAM:                                                    /82 (BP Location: Right arm, Patient Position: Chair, Cuff Size: Adult Regular)  Pulse 94  Temp 97.1  F (36.2  C) (Temporal)  Ht 5' 0.5\" (1.537 m)  Wt 164 lb 8 oz (74.6 kg)  SpO2 100%  BMI 31.6 kg/m2    GENERAL APPEARANCE: healthy, alert and no " distress     EYES: EOMI, PERRL     HENT: ear canals and TM's normal and nose and mouth without ulcers or lesions     NECK: no adenopathy, no asymmetry, masses, or scars and thyroid normal to palpation     RESP: lungs clear to auscultation - no rales, rhonchi or wheezes     CV: regular rates and rhythm, normal S1 S2, no S3 or S4 and no murmur, click or rub     ABDOMEN: Small umbilical hernia noted on exam. There is a palpable defect in the wall, 3 cm across, just around the umbilicus. soft, nontender, no HSM and bowel sounds normal     MS: extremities normal- no gross deformities noted, no evidence of inflammation in joints, FROM in all extremities.     SKIN: no suspicious lesions or rashes     NEURO: Normal strength and tone, sensory exam grossly normal, mentation intact and speech normal     PSYCH: mentation appears normal. and affect normal/bright     LYMPHATICS: No cervical or supraclavicular nodes    DIAGNOSTICS:                                                      Orders Placed This Encounter   Procedures     TSH with free T4 reflex     Lipid Profile with reflex to direct LDL     IMPRESSION:                                                      Reason for surgery/procedure: Umbilical Hernia  Diagnosis/reason for consult: Preventative Exam, Umbilical Herniorrhaphy      ICD-10-CM    1. Routine general medical examination at a health care facility Z00.00    2. Preop general physical exam Z01.818    3. Umbilical hernia without obstruction and without gangrene K42.9    4. CARDIOVASCULAR SCREENING; LDL GOAL LESS THAN 160 Z13.6 Lipid Profile with reflex to direct LDL   5. Family history of thyroid disease Z83.49 TSH with free T4 reflex     The proposed surgical procedure is considered INTERMEDIATE risk.    REVISED CARDIAC RISK INDEX  The patient has the following serious cardiovascular risks for perioperative complications such as (MI, PE, VFib and 3  AV Block):  No serious cardiac risks  INTERPRETATION: 0 risks: Class  I (very low risk - 0.4% complication rate)    The patient has the following additional risks for perioperative complications:  No identified additional risks    RECOMMENDATIONS:                                                        APPROVAL GIVEN to proceed with proposed procedure, without further diagnostic evaluation    - Labs drawn, will notify with results and discuss further evaluation/ treatment if needed at that time.   - Patient deferred her PAP to her visit next year at her request.  Future lab orders placed for screening.        Signed Electronically by: Tamara Griffin MD    Copy of this evaluation report is provided to requesting physician.    [place] Preop Guidelines

## 2017-04-03 ENCOUNTER — ANESTHESIA EVENT (OUTPATIENT)
Dept: SURGERY | Facility: CLINIC | Age: 42
End: 2017-04-03
Payer: COMMERCIAL

## 2017-04-03 ENCOUNTER — ANESTHESIA (OUTPATIENT)
Dept: SURGERY | Facility: CLINIC | Age: 42
End: 2017-04-03
Payer: COMMERCIAL

## 2017-04-03 ENCOUNTER — HOSPITAL ENCOUNTER (OUTPATIENT)
Facility: CLINIC | Age: 42
Discharge: HOME | End: 2017-04-03
Attending: SURGERY | Admitting: SURGERY
Payer: COMMERCIAL

## 2017-04-03 VITALS
OXYGEN SATURATION: 98 % | WEIGHT: 164.5 LBS | RESPIRATION RATE: 16 BRPM | SYSTOLIC BLOOD PRESSURE: 112 MMHG | TEMPERATURE: 97.9 F | BODY MASS INDEX: 31.6 KG/M2 | DIASTOLIC BLOOD PRESSURE: 97 MMHG

## 2017-04-03 DIAGNOSIS — K42.9 UMBILICAL HERNIA WITHOUT OBSTRUCTION AND WITHOUT GANGRENE: Primary | ICD-10-CM

## 2017-04-03 LAB — B-HCG SERPL-ACNC: <1 IU/L (ref 0–5)

## 2017-04-03 PROCEDURE — 25000128 H RX IP 250 OP 636: Performed by: SURGERY

## 2017-04-03 PROCEDURE — 40000306 ZZH STATISTIC PRE PROC ASSESS II: Performed by: SURGERY

## 2017-04-03 PROCEDURE — 25000132 ZZH RX MED GY IP 250 OP 250 PS 637: Performed by: SURGERY

## 2017-04-03 PROCEDURE — 71000027 ZZH RECOVERY PHASE 2 EACH 15 MINS: Performed by: SURGERY

## 2017-04-03 PROCEDURE — 36000052 ZZH SURGERY LEVEL 2 EA 15 ADDTL MIN: Performed by: SURGERY

## 2017-04-03 PROCEDURE — 25000128 H RX IP 250 OP 636: Performed by: NURSE ANESTHETIST, CERTIFIED REGISTERED

## 2017-04-03 PROCEDURE — 36000050 ZZH SURGERY LEVEL 2 1ST 30 MIN: Performed by: SURGERY

## 2017-04-03 PROCEDURE — 84702 CHORIONIC GONADOTROPIN TEST: CPT | Performed by: NURSE ANESTHETIST, CERTIFIED REGISTERED

## 2017-04-03 PROCEDURE — 27210794 ZZH OR GENERAL SUPPLY STERILE: Performed by: SURGERY

## 2017-04-03 PROCEDURE — 25000125 ZZHC RX 250: Performed by: NURSE ANESTHETIST, CERTIFIED REGISTERED

## 2017-04-03 PROCEDURE — 27110028 ZZH OR GENERAL SUPPLY NON-STERILE: Performed by: SURGERY

## 2017-04-03 PROCEDURE — 25000125 ZZHC RX 250: Performed by: SURGERY

## 2017-04-03 PROCEDURE — 37000009 ZZH ANESTHESIA TECHNICAL FEE, EACH ADDTL 15 MIN: Performed by: SURGERY

## 2017-04-03 PROCEDURE — 37000008 ZZH ANESTHESIA TECHNICAL FEE, 1ST 30 MIN: Performed by: SURGERY

## 2017-04-03 PROCEDURE — 25000564 ZZH DESFLURANE, EA 15 MIN: Performed by: SURGERY

## 2017-04-03 PROCEDURE — 71000014 ZZH RECOVERY PHASE 1 LEVEL 2 FIRST HR: Performed by: SURGERY

## 2017-04-03 PROCEDURE — 49585 ZZHC REPAIR UMBILICAL HERN,5+Y/O,REDUC: CPT | Performed by: SURGERY

## 2017-04-03 PROCEDURE — 25800025 ZZH RX 258: Performed by: NURSE ANESTHETIST, CERTIFIED REGISTERED

## 2017-04-03 RX ORDER — SODIUM CHLORIDE, SODIUM LACTATE, POTASSIUM CHLORIDE, CALCIUM CHLORIDE 600; 310; 30; 20 MG/100ML; MG/100ML; MG/100ML; MG/100ML
INJECTION, SOLUTION INTRAVENOUS CONTINUOUS
Status: DISCONTINUED | OUTPATIENT
Start: 2017-04-03 | End: 2017-04-03 | Stop reason: HOSPADM

## 2017-04-03 RX ORDER — NALOXONE HYDROCHLORIDE 0.4 MG/ML
.1-.4 INJECTION, SOLUTION INTRAMUSCULAR; INTRAVENOUS; SUBCUTANEOUS
Status: DISCONTINUED | OUTPATIENT
Start: 2017-04-03 | End: 2017-04-03 | Stop reason: HOSPADM

## 2017-04-03 RX ORDER — DEXAMETHASONE SODIUM PHOSPHATE 10 MG/ML
INJECTION, SOLUTION INTRAMUSCULAR; INTRAVENOUS PRN
Status: DISCONTINUED | OUTPATIENT
Start: 2017-04-03 | End: 2017-04-03

## 2017-04-03 RX ORDER — ONDANSETRON 4 MG/1
4 TABLET, ORALLY DISINTEGRATING ORAL EVERY 30 MIN PRN
Status: DISCONTINUED | OUTPATIENT
Start: 2017-04-03 | End: 2017-04-03 | Stop reason: HOSPADM

## 2017-04-03 RX ORDER — LIDOCAINE 40 MG/G
CREAM TOPICAL
Status: DISCONTINUED | OUTPATIENT
Start: 2017-04-03 | End: 2017-04-03 | Stop reason: HOSPADM

## 2017-04-03 RX ORDER — LIDOCAINE HYDROCHLORIDE 20 MG/ML
INJECTION, SOLUTION INFILTRATION; PERINEURAL PRN
Status: DISCONTINUED | OUTPATIENT
Start: 2017-04-03 | End: 2017-04-03

## 2017-04-03 RX ORDER — OXYCODONE HYDROCHLORIDE 5 MG/1
5 TABLET ORAL EVERY 6 HOURS PRN
Qty: 10 TABLET | Refills: 0 | Status: SHIPPED | OUTPATIENT
Start: 2017-04-03 | End: 2017-04-19

## 2017-04-03 RX ORDER — FENTANYL CITRATE 50 UG/ML
INJECTION, SOLUTION INTRAMUSCULAR; INTRAVENOUS PRN
Status: DISCONTINUED | OUTPATIENT
Start: 2017-04-03 | End: 2017-04-03

## 2017-04-03 RX ORDER — FENTANYL CITRATE 50 UG/ML
25-50 INJECTION, SOLUTION INTRAMUSCULAR; INTRAVENOUS
Status: DISCONTINUED | OUTPATIENT
Start: 2017-04-03 | End: 2017-04-03 | Stop reason: HOSPADM

## 2017-04-03 RX ORDER — CEFAZOLIN SODIUM 1 G/3ML
1 INJECTION, POWDER, FOR SOLUTION INTRAMUSCULAR; INTRAVENOUS SEE ADMIN INSTRUCTIONS
Status: DISCONTINUED | OUTPATIENT
Start: 2017-04-03 | End: 2017-04-03 | Stop reason: HOSPADM

## 2017-04-03 RX ORDER — ACETAMINOPHEN 325 MG/1
975 TABLET ORAL ONCE
Status: COMPLETED | OUTPATIENT
Start: 2017-04-03 | End: 2017-04-03

## 2017-04-03 RX ORDER — PROPOFOL 10 MG/ML
INJECTION, EMULSION INTRAVENOUS PRN
Status: DISCONTINUED | OUTPATIENT
Start: 2017-04-03 | End: 2017-04-03

## 2017-04-03 RX ORDER — CEFAZOLIN SODIUM 2 G/100ML
2 INJECTION, SOLUTION INTRAVENOUS
Status: DISCONTINUED | OUTPATIENT
Start: 2017-04-03 | End: 2017-04-03 | Stop reason: HOSPADM

## 2017-04-03 RX ORDER — OXYCODONE HYDROCHLORIDE 5 MG/1
5-10 TABLET ORAL
Status: COMPLETED | OUTPATIENT
Start: 2017-04-03 | End: 2017-04-03

## 2017-04-03 RX ORDER — DIMENHYDRINATE 50 MG/ML
25 INJECTION, SOLUTION INTRAMUSCULAR; INTRAVENOUS
Status: DISCONTINUED | OUTPATIENT
Start: 2017-04-03 | End: 2017-04-03 | Stop reason: HOSPADM

## 2017-04-03 RX ORDER — ONDANSETRON 2 MG/ML
INJECTION INTRAMUSCULAR; INTRAVENOUS PRN
Status: DISCONTINUED | OUTPATIENT
Start: 2017-04-03 | End: 2017-04-03

## 2017-04-03 RX ORDER — BUPIVACAINE HYDROCHLORIDE AND EPINEPHRINE 2.5; 5 MG/ML; UG/ML
INJECTION, SOLUTION INFILTRATION; PERINEURAL PRN
Status: DISCONTINUED | OUTPATIENT
Start: 2017-04-03 | End: 2017-04-03 | Stop reason: HOSPADM

## 2017-04-03 RX ORDER — MEPERIDINE HYDROCHLORIDE 50 MG/ML
12.5 INJECTION INTRAMUSCULAR; INTRAVENOUS; SUBCUTANEOUS
Status: DISCONTINUED | OUTPATIENT
Start: 2017-04-03 | End: 2017-04-03 | Stop reason: HOSPADM

## 2017-04-03 RX ORDER — ONDANSETRON 2 MG/ML
4 INJECTION INTRAMUSCULAR; INTRAVENOUS EVERY 30 MIN PRN
Status: DISCONTINUED | OUTPATIENT
Start: 2017-04-03 | End: 2017-04-03 | Stop reason: HOSPADM

## 2017-04-03 RX ORDER — KETOROLAC TROMETHAMINE 30 MG/ML
INJECTION, SOLUTION INTRAMUSCULAR; INTRAVENOUS PRN
Status: DISCONTINUED | OUTPATIENT
Start: 2017-04-03 | End: 2017-04-03

## 2017-04-03 RX ADMIN — PROPOFOL 50 MG: 10 INJECTION, EMULSION INTRAVENOUS at 15:12

## 2017-04-03 RX ADMIN — PROPOFOL 150 MG: 10 INJECTION, EMULSION INTRAVENOUS at 15:08

## 2017-04-03 RX ADMIN — ONDANSETRON 4 MG: 2 INJECTION INTRAMUSCULAR; INTRAVENOUS at 15:11

## 2017-04-03 RX ADMIN — MIDAZOLAM HYDROCHLORIDE 2 MG: 1 INJECTION, SOLUTION INTRAMUSCULAR; INTRAVENOUS at 15:03

## 2017-04-03 RX ADMIN — FENTANYL CITRATE 50 MCG: 50 INJECTION, SOLUTION INTRAMUSCULAR; INTRAVENOUS at 15:06

## 2017-04-03 RX ADMIN — SODIUM CHLORIDE, POTASSIUM CHLORIDE, SODIUM LACTATE AND CALCIUM CHLORIDE: 600; 310; 30; 20 INJECTION, SOLUTION INTRAVENOUS at 14:40

## 2017-04-03 RX ADMIN — SODIUM CHLORIDE, POTASSIUM CHLORIDE, SODIUM LACTATE AND CALCIUM CHLORIDE: 600; 310; 30; 20 INJECTION, SOLUTION INTRAVENOUS at 15:36

## 2017-04-03 RX ADMIN — ACETAMINOPHEN 975 MG: 325 TABLET ORAL at 14:12

## 2017-04-03 RX ADMIN — CEFAZOLIN 2 G: 1 INJECTION, POWDER, FOR SOLUTION INTRAMUSCULAR; INTRAVENOUS at 15:12

## 2017-04-03 RX ADMIN — KETOROLAC TROMETHAMINE 30 MG: 30 INJECTION, SOLUTION INTRAMUSCULAR at 15:50

## 2017-04-03 RX ADMIN — LIDOCAINE HYDROCHLORIDE 1 ML: 10 INJECTION, SOLUTION EPIDURAL; INFILTRATION; INTRACAUDAL; PERINEURAL at 14:41

## 2017-04-03 RX ADMIN — FENTANYL CITRATE 50 MCG: 50 INJECTION, SOLUTION INTRAMUSCULAR; INTRAVENOUS at 15:12

## 2017-04-03 RX ADMIN — LIDOCAINE HYDROCHLORIDE 60 MG: 20 INJECTION, SOLUTION INFILTRATION; PERINEURAL at 15:08

## 2017-04-03 RX ADMIN — DEXAMETHASONE SODIUM PHOSPHATE 10 MG: 10 INJECTION, SOLUTION INTRAMUSCULAR; INTRAVENOUS at 15:11

## 2017-04-03 RX ADMIN — OXYCODONE HYDROCHLORIDE 5 MG: 5 TABLET ORAL at 16:38

## 2017-04-03 NOTE — ANESTHESIA PREPROCEDURE EVALUATION
Anesthesia Evaluation     . Pt has had prior anesthetic.            ROS/MED HX    ENT/Pulmonary:  - neg pulmonary ROS     Neurologic:  - neg neurologic ROS     Cardiovascular:  - neg cardiovascular ROS       METS/Exercise Tolerance:     Hematologic:  - neg hematologic  ROS       Musculoskeletal:  - neg musculoskeletal ROS       GI/Hepatic:  - neg GI/hepatic ROS       Renal/Genitourinary:         Endo:  - neg endo ROS       Psychiatric:  - neg psychiatric ROS       Infectious Disease:  - neg infectious disease ROS       Malignancy:      - no malignancy   Other:    - neg other ROS                 Physical Exam  Normal systems: cardiovascular and dental    Airway   Mallampati: I  TM distance: >3 FB  Neck ROM: full    Dental     Cardiovascular   Rhythm and rate: regular and normal      Pulmonary    breath sounds clear to auscultation                    Anesthesia Plan      History & Physical Review  History and physical reviewed and following examination; no interval change.    ASA Status:  2 .    NPO Status:  > 8 hours    Plan for General and LMA with Intravenous and Propofol induction. Maintenance will be Inhalation.    PONV prophylaxis:  Ondansetron (or other 5HT-3) and Dexamethasone or Solumedrol       Postoperative Care  Postoperative pain management:  IV analgesics and Oral pain medications.      Consents  Anesthetic plan, risks, benefits and alternatives discussed with:  Patient..                          .

## 2017-04-03 NOTE — ANESTHESIA POSTPROCEDURE EVALUATION
Patient: Kristina K Riedel    Procedure(s):  umbilical hernia repair - Wound Class: I-Clean    Diagnosis:umbilical hernia without obstruction  Diagnosis Additional Information: No value filed.    Anesthesia Type:  General, LMA    Note:  Anesthesia Post Evaluation    Patient location during evaluation: Phase 2  Patient participation: Able to fully participate in evaluation  Level of consciousness: awake and alert  Pain management: adequate  Airway patency: patent  Cardiovascular status: acceptable  Respiratory status: acceptable  Hydration status: acceptable  PONV: none     Anesthetic complications: None          Last vitals:  Vitals:    04/03/17 1630 04/03/17 1645 04/03/17 1700   BP: 121/74 125/83 (!) 112/97   Resp: 16     Temp:      SpO2: 98%  98%         Electronically Signed By: PRABHAKAR Roque CRNA  April 3, 2017  5:46 PM

## 2017-04-03 NOTE — ANESTHESIA CARE TRANSFER NOTE
Patient: Kristina K Riedel    Procedure(s):  umbilical hernia repair - Wound Class: I-Clean    Diagnosis: umbilical hernia without obstruction  Diagnosis Additional Information: No value filed.    Anesthesia Type:   General, LMA     Note:  Airway :Nasal Cannula  Patient transferred to:PACU        Vitals: (Last set prior to Anesthesia Care Transfer)    CRNA VITALS  4/3/2017 1530 - 4/3/2017 1604      4/3/2017             Pulse: 102    SpO2: 100 %                Electronically Signed By: PRABHAKAR Roque CRNA  April 3, 2017  4:04 PM

## 2017-04-03 NOTE — IP AVS SNAPSHOT
MRN:[id number]                      After Visit Summary   4/3/2017    Kristina K Riedel    MRN: [id number]           Thank you!     Thank you for choosing [facility] for your care. Our goal is always to provide you with excellent care. Hearing back from our patients is one way we can continue to improve our services. Please take a few minutes to complete the written survey that you may receive in the mail after you visit with us. Thank you!        Patient Information     Date Of Birth          1975        About your hospital stay     You were admitted on:  April 3, 2017 You last received care in the:  [facility]    You were discharged on:  April 3, 2017       Who to Call     For medical emergencies, please call 911.  For non-urgent questions about your medical care, please call your primary care provider or clinic, [phone]  For questions related to your surgery, please call your surgery clinic        Attending Provider     Provider Magan Walker MD Surgery       Primary Care Provider Office Phone # Fax #    Tamara Gilma Griffin [phone]       [facility] [address] [place] [address]        After Care Instructions     Diet Instructions       Resume pre-procedure diet            Discharge Instructions       Follow up appointment as instructed by Surgeon and or RN    [facility]    Home Care Following Hernia Repair    Magan Conley MD    Hernia Type:  Umbilical    Care of the Incision:  Remove gauze dressing (if present) after 48 hours.  If surgical glue was used, keep your incision dry for 24 hours.  Then you may shower, but don t submerge under water for at least 2 weeks.  Gently pat your incision dry with a freshly laundered towel.  Do not touch your incision with bare hands or pick at scabs.  Leave your incision open to air.  Cover it only if clothing rubs or irritates it.  Activity:  Gradually increase  your activity.  Walk short distances several times each day and increase the distance as your strength allows.  To promote circulation, do not cross your legs while sitting.  No strenuous lifting or straining for 2-3 weeks.   Do not lift anything over 10-20 pounds until your doctor approves an increase.  Return to work will be determined by the type of work you do and should be discussed with your physician.  Do not drive or operate equipment while taking prescription pain medicines.  You may drive 1 week after surgery if you have stopped taking prescription pain medicines and are pain-free enough to react quickly and make an emergency stop if necessary.    Diet:  Return to the diet you were on before surgery.  Drink plenty of  water.  Avoid foods that cause constipation.    REMEMBER most prescription pain pills cause constipation.  Walking, extra fluids, and increased fiber (fresh fruits and vegetables, etc.) are natural remedies for constipation.  You can also take mineral oil, 1-2 Tablespoons per day.  If still constipated you may try a stool softener such as Colace or Miralax.    Call Your Physician if You Have:  Redness, increased swelling or cloudy drainage from your incision.  A temperature of more than 101 degrees F.  Worsening pain in your incision not relieved by your prescription pain pills and/or a short rest.  Any questions or concerns about your recovery, please call     Business hours [phone]    After hours [phone] Nurse Advice Line (24 hours a day)    Follow-up Care:  Make an appointment 2-3 weeks after your surgery.  Call [phone]            Do not - immerse incision in water for one month       Do not immerse incision in water until sutures removed            Dressing       Keep dressing clean and dry.  Dressing / incisional care as instructed by RN and or Surgeon            No driving or operating machinery       until you are not taking narcotics            No lifting       No  lifting ove10 lbs and no strenuous physical activity for 2 weeks            Shower       No shower for 24 hours post procedure. May shower Postoperative Day (POD)  1                  Your next 10 appointments already scheduled     2017  1:00 PM CDT   Return Visit with Ines Conley MD   [facility] ([facility]    [address] [place] [id number]   [phone]              Further instructions from your care team       [facility]   Adult Discharge Orders & Instructions     For 24 hours after surgery    1. Get plenty of rest.  A responsible adult must stay with you for at least 24 hours after you leave the hospital.   2. Do not drive or use heavy equipment.  If you have weakness or tingling, don't drive or use heavy equipment until this feeling goes away.  3. Do not drink alcohol.  4. Avoid strenuous or risky activities.  Ask for help when climbing stairs.   5. You may feel lightheaded.  IF so, sit for a few minutes before standing.  Have someone help you get up.   6. If you have nausea (feel sick to your stomach): Drink only clear liquids such as apple juice, ginger ale, broth or 7-Up.  Rest may also help.  Be sure to drink enough fluids.  Move to a regular diet as you feel able.  7. You may have a slight fever. Call the doctor if your fever is over 100 F (37.7 C) (taken under the tongue) or lasts longer than 24 hours.  8. You may have a dry mouth, a sore throat, muscle aches or trouble sleeping.  These should go away after 24 hours.  9. Do not make important or legal decisions.   Call your doctor for any of the followin.  Signs of infection (fever, growing tenderness at the surgery site, a large amount of drainage or bleeding, severe pain, foul-smelling drainage, redness, swelling).    2. It has been over 8 to 10 hours since surgery and you are still not able to urinate (pass water).    3.  Headache for over 24 hours.    To contact a nurse  24/7, call __________[phone]______________________________    Pending Results     No orders found from 4/1/2017 to 4/4/2017.            Admission Information     Date & Time Provider Department Dept. Phone    4/3/2017 Ines Conley MD [facility] Phase [phone]      Your Vitals Were     Blood Pressure Temperature Respirations Weight Pulse Oximetry BMI (Body Mass Index)    121/74 97.9  F (36.6  C) (Oral) 16 74.6 kg (164 lb 8 oz) 98% 31.6 kg/m2      MyChart Information     [employer] gives you secure access to your electronic health record. If you see a primary care provider, you can also send messages to your care team and make appointments. If you have questions, please call your primary care clinic.  If you do not have a primary care provider, please call [phone] and they will assist you.        Care EveryWhere ID     This is your Care EveryWhere ID. This could be used by other organizations to access your [facility] medical records  [id number]           Review of your medicines      UNREVIEWED medicines. Ask your doctor about these medicines        Dose / Directions    CALCIUM 500 + D PO        Take  by mouth.   Refills:  0       MULTI-vitamin  /IRON Tabs        1 tablet daily   Refills:  0       OMEGA 3 120-180 MG Caps        None Entered   Refills:  0       VITAMIN D PO        Dose:  600 Units   Take 600 Units by mouth.   Refills:  0       vitamin E 400 UNIT capsule        Dose:  400 Units   Take 400 Units by mouth daily   Refills:  0       ZINC ACETATE (ORAL) PO   Used for:  Viral warts, unspecified        1 CAPSULE 3 TIMES DAILY ON EMPTY STOMACH   Refills:  0         START taking        Dose / Directions    oxyCODONE 5 MG IR tablet   Commonly known as:  ROXICODONE   Used for:  Umbilical hernia without obstruction and without gangrene        Dose:  5 mg   Take 1 tablet (5 mg) by mouth every 6 hours as needed for pain or other (Moderate to Severe)   Quantity:  10 tablet   Refills:  0             Where to get your medicines      Some of these will need a paper prescription and others can be bought over the counter. Ask your nurse if you have questions.     Bring a paper prescription for each of these medications     oxyCODONE 5 MG IR tablet                Protect others around you: Learn how to safely use, store and throw away your medicines at www.disposemymeds.org.             Medication List: This is a list of all your medications and when to take them. Check marks below indicate your daily home schedule. Keep this list as a reference.      Medications           Morning Afternoon Evening Bedtime As Needed    CALCIUM 500 + D PO   Take  by mouth.                                MULTI-vitamin  /IRON Tabs   1 tablet daily                                OMEGA 3 120-180 MG Caps   None Entered                                oxyCODONE 5 MG IR tablet   Commonly known as:  ROXICODONE   Take 1 tablet (5 mg) by mouth every 6 hours as needed for pain or other (Moderate to Severe)   Last time this was given:  5 mg on 4/3/2017  4:38 PM   Next Dose Due:  10:30 PM                                VITAMIN D PO   Take 600 Units by mouth.                                vitamin E 400 UNIT capsule   Take 400 Units by mouth daily                                ZINC ACETATE (ORAL) PO   1 CAPSULE 3 TIMES DAILY ON EMPTY STOMACH

## 2017-04-03 NOTE — IP AVS SNAPSHOT
[facility] Phase II    [address]     [place] [address]    Phone:  [phone]                                       After Visit Summary   4/3/2017    Kristina K Riedel    MRN: [id number]           After Visit Summary Signature Page     I have received my discharge instructions, and my questions have been answered. I have discussed any challenges I see with this plan with the nurse or doctor.    ..........................................................................................................................................  Patient/Patient Representative Signature      ..........................................................................................................................................  Patient Representative Print Name and Relationship to Patient    ..................................................               ................................................  Date                                            Time    ..........................................................................................................................................  Reviewed by Signature/Title    ...................................................              ..............................................  Date                                                            Time

## 2017-04-03 NOTE — DISCHARGE INSTRUCTIONS
[facility]   Adult Discharge Orders & Instructions     For 24 hours after surgery    1. Get plenty of rest.  A responsible adult must stay with you for at least 24 hours after you leave the hospital.   2. Do not drive or use heavy equipment.  If you have weakness or tingling, don't drive or use heavy equipment until this feeling goes away.  3. Do not drink alcohol.  4. Avoid strenuous or risky activities.  Ask for help when climbing stairs.   5. You may feel lightheaded.  IF so, sit for a few minutes before standing.  Have someone help you get up.   6. If you have nausea (feel sick to your stomach): Drink only clear liquids such as apple juice, ginger ale, broth or 7-Up.  Rest may also help.  Be sure to drink enough fluids.  Move to a regular diet as you feel able.  7. You may have a slight fever. Call the doctor if your fever is over 100 F (37.7 C) (taken under the tongue) or lasts longer than 24 hours.  8. You may have a dry mouth, a sore throat, muscle aches or trouble sleeping.  These should go away after 24 hours.  9. Do not make important or legal decisions.   Call your doctor for any of the followin.  Signs of infection (fever, growing tenderness at the surgery site, a large amount of drainage or bleeding, severe pain, foul-smelling drainage, redness, swelling).    2. It has been over 8 to 10 hours since surgery and you are still not able to urinate (pass water).    3.  Headache for over 24 hours.    To contact a nurse , call __________[phone]______________________________

## 2017-04-03 NOTE — OP NOTE
DATE OF PROCEDURE: 4/3/2017      PREOPERATIVE DIAGNOSIS: Umbilical hernia.       POSTOPERATIVE DIAGNOSIS: Umbilical hernia.       OPERATIVE PROCEDURE: Umbilical hernia, primary repair    ANESTHESIA: LMA    FIRST ASSISTANT: Surgical Tech      OPERATIVE FINDINGS: A 1 cm umbilical hernia defect with omentum.       DESCRIPTION OF PROCEDURE: Kristina K Riedel was laid supine. She had an LMA for anesthetic and the abdomen was prepped and draped in sterile fashion. After surgical pause, we proceeded with making a semicircular incision infraumbilically. We went down to the level of the fascia. We then encircled the umbilical stalk and then elevated it from the anterior abdominal fascia to find a 1 cm defect with omental fat. This fat was reduced and care was taken to reduce the adhesions of the omental fat to the anterior abdominal wall so that it would drop back into the abdomen. Once this was complete, we closed the defect with 0 PDS suture. We then reattached the umbilical stalk using 3-0 Vicryl x 2 sutures and then injected the anterior abdominal wall with lidocaine. We then did a subcuticular Vicryl subdermal stitch and then closed the incision with 4-0 Monocryl. A small defect was noted at the based of the umbilicus this was closed with a single 3-0 chromic suture. Glue  were applied. The patient tolerated the procedure well. About 12 mL of local was infiltrated. She was extubated and taken to PACU in stable condition.       ESTIMATED BLOOD LOSS:0 mL.       COMPLICATIONS: None.           ABA BATISTA MD

## 2017-04-03 NOTE — ANESTHESIA CARE TRANSFER NOTE
Patient: Kristina K Riedel    Procedure(s):  umbilical hernia repair - Wound Class: I-Clean    Diagnosis: umbilical hernia without obstruction  Diagnosis Additional Information: No value filed.    Anesthesia Type:   General, LMA     Note:  Airway :Room Air  Patient transferred to:Phase II        Vitals: (Last set prior to Anesthesia Care Transfer)    CRNA VITALS  4/3/2017 1530 - 4/3/2017 1630      4/3/2017             Pulse: 102    SpO2: 100 %                Electronically Signed By: PRABHAKAR Roque CRNA  April 3, 2017  5:46 PM

## 2017-04-19 ENCOUNTER — OFFICE VISIT (OUTPATIENT)
Dept: URGENT CARE | Facility: RETAIL CLINIC | Age: 42
End: 2017-04-19
Payer: COMMERCIAL

## 2017-04-19 VITALS
DIASTOLIC BLOOD PRESSURE: 74 MMHG | TEMPERATURE: 97.9 F | OXYGEN SATURATION: 99 % | HEART RATE: 60 BPM | SYSTOLIC BLOOD PRESSURE: 124 MMHG

## 2017-04-19 DIAGNOSIS — J01.90 ACUTE SINUSITIS WITH SYMPTOMS > 10 DAYS: Primary | ICD-10-CM

## 2017-04-19 PROCEDURE — 99203 OFFICE O/P NEW LOW 30 MIN: CPT | Performed by: PHYSICIAN ASSISTANT

## 2017-04-19 NOTE — PATIENT INSTRUCTIONS
Take antibiotic as directed  Apply warm facial compresses/packs for 5-10 minutes three times daily.  Drink plenty of fluids- 6 to 10 glasses of liquids each day. Rest.  Saline drops or nasal sprays as needed.   May use netti pot as needed. Do not use tap water. May use filtered or distilled water.  Steam treatments or humidifier.  Sudafed (decongestant) for congestion.  Mucinex (guaifenesin) to thin out secretions.  Tylenol or ibuprofen as needed for pain or fever  Follow up at your primary care clinic for increasing pain, high fever, vision changes, worsening symptoms, or no relief from symptoms after 7-10 days.  Please follow up with primary care provider if not improving, worsening or new symptoms or for any adverse reactions to medications.

## 2017-04-19 NOTE — PROGRESS NOTES
Chief Complaint   Patient presents with     Sinus Problem     x 3 days, fever yesterday at 100, sinus headaches and pressure     Cough     14 days, coughing yellow phlegm, cough is getting better     SUBJECTIVE:  Kristina K Riedel is a 41 year old female here with concerns about sinus infection. Has had cold sxs and cough for 2 weeks, cough has improved, now having sinus symptoms worsening over past 3 days.  She states onset of symptoms were 2 week(s) ago.  She has had maxillary, frontal pressure. Course of illness is worsening. Severity moderate  Current and Associated symptoms: nasal congestion, cough , facial pain/pressure, headache and post-nasal drainage, ears plugged  Predisposing factors include HX of sinusitis, last one about 6 yrs ago treated with Augmentin and recent illness. Recent treatment has included: OTC meds    Past Medical History:   Diagnosis Date     NVD (normal vaginal delivery)     x3     Current Outpatient Prescriptions   Medication Sig Dispense Refill     Acetaminophen (TYLENOL PO)        amoxicillin-clavulanate (AUGMENTIN) 875-125 MG per tablet Take 1 tablet by mouth 2 times daily for 10 days 20 tablet 0     vitamin E 400 UNIT capsule Take 400 Units by mouth daily       VITAMIN D PO Take 600 Units by mouth Reported on 4/19/2017       Calcium Carbonate-Vitamin D (CALCIUM 500 + D PO) Take  by mouth.       ZINC ACETATE (ORAL) OR 1 CAPSULE 3 TIMES DAILY ON EMPTY STOMACH       MULTI-VITAMIN/IRON OR TABS 1 tablet daily       OMEGA 3 120-180 MG OR CAPS None Entered        No Known Allergies     History   Smoking Status     Never Smoker   Smokeless Tobacco     Never Used       ROS:  Review of systems negative except as stated above.    OBJECTIVE:  /74 (BP Location: Left arm)  Pulse 60  Temp 97.9  F (36.6  C) (Temporal)  SpO2 99%  Exam:GENERAL APPEARANCE: mildly ill appearing  EYES:  conjunctiva clear  HENT: TMs gray with serous effusion, nasal turbinates erythematous, swollen, rhinorrhea  yellow, oral mucous membranes moist, no erythema noted, maxillary sinus tenderness. Post nasal drainage noted in the pharynx.  NECK: supple, nontender, no lymphadenopathy  RESP: lungs clear to auscultation - no rales, rhonchi or wheezes  CV: regular rates and rhythm, normal S1 S2, no murmur noted  SKIN: no suspicious lesions or rashes    ASSESSMENT:  (J01.90) Acute sinusitis with symptoms > 10 days    PLAN:  amoxicillin-clavulanate (AUGMENTIN) 875-125 MG  per tablet  Take antibiotic as directed  Apply warm facial compresses/packs for 5-10 minutes three times daily.  Drink plenty of fluids- 6 to 10 glasses of liquids each day. Rest.  Saline drops or nasal sprays as needed.   May use netti pot as needed. Do not use tap water. May use filtered or distilled water.  Steam treatments or humidifier.  Sudafed (decongestant) for congestion.  Mucinex (guaifenesin) to thin out secretions.  Tylenol or ibuprofen as needed for pain or fever  Follow up at your primary care clinic for increasing pain, high fever, vision changes, worsening symptoms, or no relief from symptoms after 7-10 days.  Please follow up with primary care provider if not improving, worsening or new symptoms or for any adverse reactions to medications.       Myah Sahni PA-C  [facility]

## 2017-04-19 NOTE — NURSING NOTE
"Chief Complaint   Patient presents with     Sinus Problem     x 3 days, fever yesterday at 100, sinus headaches and pressure     Cough     14 days, coughing yellow phlegm, cough is getting better       Initial /74 (BP Location: Left arm)  Pulse 60  Temp 97.9  F (36.6  C) (Temporal)  SpO2 99% Estimated body mass index is 31.6 kg/(m^2) as calculated from the following:    Height as of 3/13/17: 5' 0.5\" (1.537 m).    Weight as of 4/3/17: 164 lb 8 oz (74.6 kg).  Medication Reconciliation: complete    "

## 2017-04-19 NOTE — MR AVS SNAPSHOT
After Visit Summary   4/19/2017    Kristina K Riedel    MRN: [id number]           Patient Information     Date Of Birth          1975        Visit Information        Provider Department      4/19/2017 9:20 AM Myah Sahni PA-C [facility]        Today's Diagnoses     Acute sinusitis with symptoms > 10 days    -  1      Care Instructions    Take antibiotic as directed  Apply warm facial compresses/packs for 5-10 minutes three times daily.  Drink plenty of fluids- 6 to 10 glasses of liquids each day. Rest.  Saline drops or nasal sprays as needed.   May use netti pot as needed. Do not use tap water. May use filtered or distilled water.  Steam treatments or humidifier.  Sudafed (decongestant) for congestion.  Mucinex (guaifenesin) to thin out secretions.  Tylenol or ibuprofen as needed for pain or fever  Follow up at your primary care clinic for increasing pain, high fever, vision changes, worsening symptoms, or no relief from symptoms after 7-10 days.  Please follow up with primary care provider if not improving, worsening or new symptoms or for any adverse reactions to medications.               Follow-ups after your visit        Your next 10 appointments already scheduled     Apr 21, 2017  1:00 PM CDT   Return Visit with Ines Conley MD   [facility] ([facility])    [address] [place] [id number] [phone]              Who to contact     You can reach your care team any time of the day by calling [phone].  Notification of test results:  If you have an abnormal lab result, we will notify you by phone as soon as possible.         Additional Information About Your Visit        MyChart Information     Topmallt gives you secure access to your electronic health record. If you see a primary care provider, you can also send messages to your care team and make appointments. If you have questions, please call your primary  [facility].  If you do not have a primary care provider, please call [phone] and they will assist you.        Care EveryWhere ID     This is your Care EveryWhere ID. This could be used by other organizations to access your [facility] medical records  [id number]        Your Vitals Were     Pulse Temperature Pulse Oximetry             60 97.9  F (36.6  C) (Temporal) 99%          Blood Pressure from Last 3 Encounters:   04/19/17 124/74   04/03/17 (!) 112/97   03/22/17 124/68    Weight from Last 3 Encounters:   04/03/17 164 lb 8 oz (74.6 kg)   03/13/17 164 lb 8 oz (74.6 kg)   01/03/17 161 lb (73 kg)              Today, you had the following     No orders found for display         Today's Medication Changes          These changes are accurate as of: 4/19/17 10:12 AM.  If you have any questions, ask your nurse or doctor.               Start taking these medicines.        Dose/Directions    amoxicillin-clavulanate 875-125 MG per tablet   Commonly known as:  AUGMENTIN   Used for:  Acute sinusitis with symptoms > 10 days        Dose:  1 tablet   Take 1 tablet by mouth 2 times daily for 10 days   Quantity:  20 tablet   Refills:  0            Where to get your medicines      These medications were sent to [id number] - [place], [place] - [address] [address] [facility]  [address] [facility] [address]     Phone:  [phone]     amoxicillin-clavulanate 875-125 MG per tablet                Primary Care Provider Office Phone # Fax #    Tamara Griffin [phone]       [place] [id number] [place] [address]        Thank you!     Thank you for choosing [facility]  for your care. Our goal is always to provide you with excellent care. Hearing back from our patients is one way we can continue to improve our services. Please take a few minutes to complete the written survey that you may receive in the mail after your visit with us. Thank you!             Your  Updated Medication List - Protect others around you: Learn how to safely use, store and throw away your medicines at www.disposemymeds.org.          This list is accurate as of: 4/19/17 10:12 AM.  Always use your most recent med list.                   Brand Name Dispense Instructions for use    amoxicillin-clavulanate 875-125 MG per tablet    AUGMENTIN    20 tablet    Take 1 tablet by mouth 2 times daily for 10 days       CALCIUM 500 + D PO      Take  by mouth.       MULTI-vitamin  /IRON Tabs      1 tablet daily       OMEGA 3 120-180 MG Caps      None Entered       TYLENOL PO          VITAMIN D PO      Take 600 Units by mouth Reported on 4/19/2017       vitamin E 400 UNIT capsule      Take 400 Units by mouth daily       ZINC ACETATE (ORAL) PO      1 CAPSULE 3 TIMES DAILY ON EMPTY STOMACH

## 2017-04-21 ENCOUNTER — OFFICE VISIT (OUTPATIENT)
Dept: SURGERY | Facility: CLINIC | Age: 42
End: 2017-04-21
Payer: COMMERCIAL

## 2017-04-21 VITALS — TEMPERATURE: 98.1 F | SYSTOLIC BLOOD PRESSURE: 130 MMHG | HEART RATE: 80 BPM | DIASTOLIC BLOOD PRESSURE: 84 MMHG

## 2017-04-21 DIAGNOSIS — Z09 S/P UMBILICAL HERNIA REPAIR, FOLLOW-UP EXAM: Primary | ICD-10-CM

## 2017-04-21 PROCEDURE — 99024 POSTOP FOLLOW-UP VISIT: CPT | Performed by: SURGERY

## 2017-04-21 NOTE — MR AVS SNAPSHOT
After Visit Summary   4/21/2017    Kristina K Riedel    MRN: [id number]           Patient Information     Date Of Birth          1975        Visit Information        Provider Department      4/21/2017 1:00 PM Ines Conley MD [facility]        Today's Diagnoses     S/P umbilical hernia repair, follow-up exam    -  1       Follow-ups after your visit        Who to contact     If you have questions or need follow up information about today's clinic visit or your schedule please contact [facility] directly at [phone].  Normal or non-critical lab and imaging results will be communicated to you by TabSquarehart, letter or phone within 4 business days after the clinic has received the results. If you do not hear from us within 7 days, please contact the clinic through Strataviat or phone. If you have a critical or abnormal lab result, we will notify you by phone as soon as possible.  Submit refill requests through WomStreet or call your pharmacy and they will forward the refill request to us. Please allow 3 business days for your refill to be completed.          Additional Information About Your Visit        MyChart Information     WomStreet gives you secure access to your electronic health record. If you see a primary care provider, you can also send messages to your care team and make appointments. If you have questions, please call your primary care clinic.  If you do not have a primary care provider, please call [phone] and they will assist you.        Care EveryWhere ID     This is your Care EveryWhere ID. This could be used by other organizations to access your [facility] medical records  [id number]        Your Vitals Were     Pulse Temperature                80 98.1  F (36.7  C) (Temporal)           Blood Pressure from Last 3 Encounters:   04/21/17 130/84   04/19/17 124/74   04/03/17 (!) 112/97    Weight from Last 3 Encounters:   04/03/17 164 lb 8 oz (74.6 kg)    03/13/17 164 lb 8 oz (74.6 kg)   01/03/17 161 lb (73 kg)              Today, you had the following     No orders found for display       Primary Care Provider Office Phone # Fax #    Tamara Gilma Griffin [phone]       [facility] [address] [place] [address]        Thank you!     Thank you for choosing [facility]  for your care. Our goal is always to provide you with excellent care. Hearing back from our patients is one way we can continue to improve our services. Please take a few minutes to complete the written survey that you may receive in the mail after your visit with us. Thank you!             Your Updated Medication List - Protect others around you: Learn how to safely use, store and throw away your medicines at www.disposemymeds.org.          This list is accurate as of: 4/21/17  3:01 PM.  Always use your most recent med list.                   Brand Name Dispense Instructions for use    amoxicillin-clavulanate 875-125 MG per tablet    AUGMENTIN    20 tablet    Take 1 tablet by mouth 2 times daily for 10 days       CALCIUM 500 + D PO      Take  by mouth.       MULTI-vitamin  /IRON Tabs      1 tablet daily       OMEGA 3 120-180 MG Caps      None Entered       TYLENOL PO          VITAMIN D PO      Take 600 Units by mouth Reported on 4/19/2017       vitamin E 400 UNIT capsule      Take 400 Units by mouth daily       ZINC ACETATE (ORAL) PO      1 CAPSULE 3 TIMES DAILY ON EMPTY STOMACH

## 2017-04-21 NOTE — PROGRESS NOTES
[facility] NOTE  GENERAL SURGERY    PCP: Tamara Griffin         Subjective:     Kristina K Riedel is a 41 year old female who presents with RECHECK (hernia repair done on 04/03/17)    Pain has been well controlled. Currently is not using pain medications. Eating a regular diet and having regular bladder/bowel function. Overall doing well.         Objective:     /84  Pulse 80  Temp 98.1  F (36.7  C) (Temporal)   Constitutional: Awake, alert, in no acute distress.  Respiratory: Non-labored.   Cardiovascular: Regular rate and rhythm.   Abdomen: Incision is clean and dry without erythema. Healing well         Assessment and Plan:   No diagnosis found.      Kristina K Riedel is a 41 year old female who presented post operatively and is doing very well    Cut a small suture that was visible.   Paper work for disability filled out. Return to work 4/28/17  She may increase her activity.   Follow up with general surgery as needed.      Magan Conley MD  [facility]

## 2017-04-21 NOTE — NURSING NOTE
"Chief Complaint   Patient presents with     RECHECK     hernia repair done on 04/03/17       Initial /84  Pulse 80  Temp 98.1  F (36.7  C) (Temporal) Estimated body mass index is 31.6 kg/(m^2) as calculated from the following:    Height as of 3/13/17: 5' 0.5\" (1.537 m).    Weight as of 4/3/17: 164 lb 8 oz (74.6 kg).  Medication Reconciliation:complete  Giovanni ESTRADA CMA      "

## 2017-05-23 NOTE — TELEPHONE ENCOUNTER
Call attempted, no answer. SUSAN Olson      77 yo male had spinal fusion at [facility] last month, has been in [facility], sent to [facility] 6 times for nausea and back pain not relieved by Zofran PO with PO Dilaudid. Sent by Dr. Springer for admission to regulate pain and nausea meds. Plan Labs, IV fluids, Dilaudid and Zofran IV. Dr. Vazquez to admit and consider inpatient pain mngmnt consult/Detox.

## 2017-08-19 ENCOUNTER — HEALTH MAINTENANCE LETTER (OUTPATIENT)
Age: 42
End: 2017-08-19

## 2017-11-10 NOTE — TELEPHONE ENCOUNTER
Spoke to patient and she states that her medication DILT- mg 24 hr capsule will no longer be covered by her current insurance after the new year and she would like Dr. Kennedy to order an alternative. She is aware Dr. Kennedy is unavailable until next week and will be fine with call back then. Will review with Dr. Kennedy.     Per Dr. Kennedy ask patient what they will pay for.     Patient states they no longer will carry the XR, they will pay for 240 mg CD or 240 SR. She would like a 90 day supply sent to [facility] today and she would like to know which one Dr. Kennedy would probably use so she can let her new insurance know. Will review further with Dr. Kennedy.     Per Dr. Kennedy he will order Diltiazem 240 mg CD when we re-order, patient notified and she will let us know when she needs refill.    Spoke to patient, she rescheduled surgery for 3/30/2017. Antonette in OR notified.

## 2018-01-07 ENCOUNTER — VIRTUAL VISIT (OUTPATIENT)
Dept: FAMILY MEDICINE | Facility: OTHER | Age: 43
End: 2018-01-07

## 2018-01-07 NOTE — PROGRESS NOTES
"Date:   Clinician: Anjel Smith  Clinician NPI: [id number]  Patient: Kristina Riedel  Patient : 1975  Patient Address: [address] [place] [address]  Patient Phone: [phone]  Visit Protocol: URI  Patient Summary:  Laya is a 42 year old ( : 1975 ) female who initiated a Visit for cold, sinus infection, or influenza. When asked the question \"Please sign me up to receive news, health information and promotions. \", Laya responded \"No\".    Laya states her symptoms started gradually 2-3 weeks ago.   Her symptoms consist of myalgia, a headache, facial pain or pressure, nasal congestion, enlarged lymph nodes, malaise, and a cough. Laya also feels feverish.   Symptom Details     Nasal secretions: The color of her mucus is green and yellow.    Cough: Laya coughs almost every minute and her cough is more bothersome at night. Phlegm comes into her throat when she coughs. She believes the phlegm causes the cough. The color of the phlegm is green and yellow.     Temperature: Her current temperature is 99.9 degrees Fahrenheit.     Facial pain or pressure: The facial pain or pressure does not feel worse when bending or leaning forward.     Headache: She states the headache is mild.      Laya denies having rhinitis, teeth pain, sore throat, wheezing, chills, dyspnea, and ear pain. She also denies taking antibiotic medication for the symptoms, double sickening, and having recent facial or sinus surgery in the past 60 days.   She has not recently been exposed to someone with influenza. Laya has been in close contact with the following high risk individuals: adults 65 or older, children under the age of 5, and people with asthma, heart disease or diabetes.   Laya had 1 sinus infection within the past year.   Weight: 155 lbs   Laya does not smoke or use smokeless tobacco.   She denies pregnancy and denies breastfeeding. She has menstruated in the past " month.   MEDICATIONS:  Ibuprofen (Advil, Motrin)  , ALLERGIES:  NKDA   Clinician Response:  Dear Laya,  Based on the information you have provided, you likely have  acute bacterial sinusitis, otherwise known as a sinus infection.  I am prescribing amoxicillin-Pot Clavulanate [Augmentin] 875-125mg. Take one tablet by mouth two times a day for 10 days. There are no refills with this prescription.   I am prescribing benzonatate (Tessalon Perles) 100 mg to treat your cough. Take one to two tablets by mouth three times a day as needed for cough. There are no refills with this prescription.   Unless your are allergic to the over-the-counter medication(s) below, I recommend using:   Saline nasal spray (such as Ocean or store brand). Use 1-2 sprays in each nostril 3 times a day as needed for congestion. This is an over-the-counter medication that does not require a prescription.   Acetaminophen (Tylenol), which helps to reduce your discomfort and fever. Take 1-2 pills every 4-6 hours. This is an over-the-counter medication that does not require a prescription.   Ibuprofen. Take 1-3 tablets (200-600mg) every 8 hours to help with the discomfort. Make sure to take the ibuprofen with food. Do not exceed 2400mg in 24 hours. This is an over-the-counter medication that does not require a prescription.   Some people develop allergies to antibiotics. If you have significant swelling or difficulty breathing, stop the medication immediately and call 911 or go to an emergency room. If you notice a new rash, be seen at a clinic or urgent care.  Some women may also develop a yeast infection as a side effect of taking antibiotics. If you notice symptoms of a yeast infection, please use [facility] to get treatment.  If you become pregnant during this course of treatment, stop taking the medication and contact your primary care provider.  You will feel better faster if you take care of yourself by getting more rest and drinking plenty of  liquids, especially water.  Remember to wash your hands often and stay home while you are sick to decrease the chance you will spread your infection to others.   Diagnosis: Acute bacterial sinusitis  Diagnosis ICD: J01.90  Prescription: benzonatate (Tessalon Perles) 100 mg oral capsule 30 capsules, 5 days supply. Take one to two capsules by mouth three times a day as needed. Refills: 0, Refill as needed: no, Allow substitutions: yes  Prescription: amoxicillin-Pot Clavulanate (Augmentin) 875-125 mg oral tablet 20 tablets, 10 days supply. Take one tablet by mouth two times a day for 10 days. Refills: 0, Refill as needed: no, Allow substitutions: yes  Pharmacy: [facility] - [phone] - [address], [place] [address]

## 2018-04-27 ENCOUNTER — OFFICE VISIT (OUTPATIENT)
Dept: FAMILY MEDICINE | Facility: CLINIC | Age: 43
End: 2018-04-27
Payer: COMMERCIAL

## 2018-04-27 VITALS
BODY MASS INDEX: 31.88 KG/M2 | SYSTOLIC BLOOD PRESSURE: 120 MMHG | TEMPERATURE: 97.1 F | DIASTOLIC BLOOD PRESSURE: 78 MMHG | OXYGEN SATURATION: 99 % | HEART RATE: 96 BPM | HEIGHT: 60 IN | WEIGHT: 162.4 LBS

## 2018-04-27 DIAGNOSIS — J30.2 CHRONIC SEASONAL ALLERGIC RHINITIS, UNSPECIFIED TRIGGER: ICD-10-CM

## 2018-04-27 DIAGNOSIS — E66.811 CLASS 1 OBESITY DUE TO EXCESS CALORIES WITHOUT SERIOUS COMORBIDITY WITH BODY MASS INDEX (BMI) OF 31.0 TO 31.9 IN ADULT: ICD-10-CM

## 2018-04-27 DIAGNOSIS — Z00.00 ENCOUNTER FOR ROUTINE ADULT HEALTH EXAMINATION WITHOUT ABNORMAL FINDINGS: Primary | ICD-10-CM

## 2018-04-27 DIAGNOSIS — Z12.39 SCREENING FOR MALIGNANT NEOPLASM OF BREAST: ICD-10-CM

## 2018-04-27 DIAGNOSIS — E66.09 CLASS 1 OBESITY DUE TO EXCESS CALORIES WITHOUT SERIOUS COMORBIDITY WITH BODY MASS INDEX (BMI) OF 31.0 TO 31.9 IN ADULT: ICD-10-CM

## 2018-04-27 DIAGNOSIS — Z12.4 SCREENING FOR MALIGNANT NEOPLASM OF CERVIX: ICD-10-CM

## 2018-04-27 PROCEDURE — 99396 PREV VISIT EST AGE 40-64: CPT | Performed by: FAMILY MEDICINE

## 2018-04-27 PROCEDURE — 87624 HPV HI-RISK TYP POOLED RSLT: CPT | Performed by: FAMILY MEDICINE

## 2018-04-27 PROCEDURE — G0145 SCR C/V CYTO,THINLAYER,RESCR: HCPCS | Performed by: FAMILY MEDICINE

## 2018-04-27 RX ORDER — FERROUS GLUCONATE 324(38)MG
324 TABLET ORAL 2 TIMES DAILY
COMMUNITY
Start: 2018-04-27 | End: 2019-10-07

## 2018-04-27 ASSESSMENT — PAIN SCALES - GENERAL: PAINLEVEL: NO PAIN (0)

## 2018-04-27 NOTE — MR AVS SNAPSHOT
After Visit Summary   4/27/2018    Kristina K Riedel    MRN: [id number]           Patient Information     Date Of Birth          1975        Visit Information        Provider Department      4/27/2018 7:45 AM Tamara Griffin MD [facility]        Today's Diagnoses     Encounter for routine adult health examination without abnormal findings    -  1    Screening for malignant neoplasm of cervix        Chronic seasonal allergic rhinitis, unspecified trigger        Screening for malignant neoplasm of breast          Care Instructions      Preventive Health Recommendations  Female Ages 40 to 49    Yearly exam:     See your health care provider every year in order to  1. Review health changes.   2. Discuss preventive care.    3. Review your medicines if your doctor prescribed any.      Get a Pap test every three years (unless you have an abnormal result and your provider advises testing more often).      If you get Pap tests with HPV test, you only need to test every 5 years, unless you have an abnormal result. You do not need a Pap test if your uterus was removed (hysterectomy) and you have not had cancer.      You should be tested each year for STDs (sexually transmitted diseases), if you're at risk.       Ask your doctor if you should have a mammogram.      Have a colonoscopy (test for colon cancer) if someone in your family has had colon cancer or polyps before age 50.       Have a cholesterol test every 5 years.       Have a diabetes test (fasting glucose) after age 45. If you are at risk for diabetes, you should have this test every 3 years.    Shots: Get a flu shot each year. Get a tetanus shot every 10 years.     Nutrition:     Eat at least 5 servings of fruits and vegetables each day.    Eat whole-grain bread, whole-wheat pasta and brown rice instead of white grains and rice.    Talk to your provider about Calcium and Vitamin D.     Lifestyle    Exercise at least  "150 minutes a week (an average of 30 minutes a day, 5 days a week). This will help you control your weight and prevent disease.    Limit alcohol to one drink per day.    No smoking.     Wear sunscreen to prevent skin cancer.    See your dentist every six months for an exam and cleaning.          Follow-ups after your visit        Future tests that were ordered for you today     Open Future Orders        Priority Expected Expires Ordered    MA Screen Bilateral w/Zac Routine  4/27/2019 4/27/2018            Who to contact     If you have questions or need follow up information about today's clinic visit or your schedule please contact [facility] directly at [phone].  Normal or non-critical lab and imaging results will be communicated to you by Frontleafhart, letter or phone within 4 business days after the clinic has received the results. If you do not hear from us within 7 days, please contact the clinic through TotalTakeoutt or phone. If you have a critical or abnormal lab result, we will notify you by phone as soon as possible.  Submit refill requests through Align Networks or call your pharmacy and they will forward the refill request to us. Please allow 3 business days for your refill to be completed.          Additional Information About Your Visit        FrontleafharEnpocket Information     Align Networks gives you secure access to your electronic health record. If you see a primary care provider, you can also send messages to your care team and make appointments. If you have questions, please call your primary care clinic.  If you do not have a primary care provider, please call [phone] and they will assist you.        Care EveryWhere ID     This is your Care EveryWhere ID. This could be used by other organizations to access your [facility] medical records  [id number]        Your Vitals Were     Pulse Temperature Height Last Period Pulse Oximetry Breastfeeding?    96 97.1  F (36.2  C) (Temporal) 5' 0.25\" (1.53 m) " 04/13/2018 (Approximate) 99% No    BMI (Body Mass Index)                   31.45 kg/m2            Blood Pressure from Last 3 Encounters:   04/27/18 120/78   04/21/17 130/84   04/19/17 124/74    Weight from Last 3 Encounters:   04/27/18 162 lb 6.4 oz (73.7 kg)   04/03/17 164 lb 8 oz (74.6 kg)   03/13/17 164 lb 8 oz (74.6 kg)              We Performed the Following     HPV High Risk Types DNA Cervical     Pap imaged thin layer screen with HPV - recommended age 30 - 65 years (select HPV order below)          Today's Medication Changes          These changes are accurate as of 4/27/18  8:22 AM.  If you have any questions, ask your nurse or doctor.               Start taking these medicines.        Dose/Directions    ferrous gluconate 324 (38 Fe) MG tablet   Commonly known as:  FERGON   Used for:  Encounter for routine adult health examination without abnormal findings   Started by:  Tamara Griffin MD        Dose:  324 mg   Take 1 tablet (324 mg) by mouth 2 times daily   Refills:  0            Where to get your medicines      Some of these will need a paper prescription and others can be bought over the counter.  Ask your nurse if you have questions.     You don't need a prescription for these medications     ferrous gluconate 324 (38 Fe) MG tablet                Primary Care Provider Office Phone # Fax #    Tamara Griffin [phone] [address] [place] [address]        Equal Access to Services     [facility] AH: Hadii ulises [place], waaxda luqadaha, qaybta kaalmada thiernoda, matthias guerra. So [facility] [phone].    ATENCIÓN: Si habla español, tiene a seymour disposición servicios gratuitos de asistencia lingüística. Llame al [phone].    We comply with applicable federal civil rights laws and [place] laws. We do not discriminate on the basis of race, color, national origin, age, disability, sex, sexual orientation, or  gender identity.            Thank you!     Thank you for choosing [facility]  for your care. Our goal is always to provide you with excellent care. Hearing back from our patients is one way we can continue to improve our services. Please take a few minutes to complete the written survey that you may receive in the mail after your visit with us. Thank you!             Your Updated Medication List - Protect others around you: Learn how to safely use, store and throw away your medicines at www.disposemymeds.org.          This list is accurate as of 4/27/18  8:22 AM.  Always use your most recent med list.                   Brand Name Dispense Instructions for use Diagnosis    CALCIUM 500 + D PO      Take  by mouth.        ferrous gluconate 324 (38 Fe) MG tablet    FERGON     Take 1 tablet (324 mg) by mouth 2 times daily    Encounter for routine adult health examination without abnormal findings       MULTI-vitamin  /IRON Tabs      1 tablet daily        OMEGA 3 120-180 MG Caps      None Entered        TYLENOL PO           VITAMIN D PO      Take 600 Units by mouth Reported on 4/19/2017        vitamin E 400 UNIT capsule      Take 400 Units by mouth daily        ZINC ACETATE (ORAL) PO      1 CAPSULE 3 TIMES DAILY ON EMPTY STOMACH    Viral warts, unspecified

## 2018-04-27 NOTE — PROGRESS NOTES
SUBJECTIVE:   CC: Kristina K Riedel is an 42 year old woman who presents for preventive health visit.     Physical   Annual:     Getting at least 3 servings of Calcium per day::  Yes    Bi-annual eye exam::  Yes    Dental care twice a year::  Yes    Sleep apnea or symptoms of sleep apnea::  None    Diet::  Regular (no restrictions)    Frequency of exercise::  4-5 days/week    Duration of exercise::  45-60 minutes    Taking medications regularly::  Not Applicable    Additional concerns today::  No              Today's PHQ-2 Score:   PHQ-2 ( 1999 [employer]) 4/24/2018   Q1: Little interest or pleasure in doing things 0   Q2: Feeling down, depressed or hopeless 0   PHQ-2 Score 0   Q1: Little interest or pleasure in doing things Not at all   Q2: Feeling down, depressed or hopeless Not at all   PHQ-2 Score 0     Answers for HPI/ROS submitted by the patient on 4/24/2018   PHQ-2 Score: 0    Abuse: Current or Past(Physical, Sexual or Emotional)- No  Do you feel safe in your environment - Yes    Social History   Substance Use Topics     Smoking status: Never Smoker     Smokeless tobacco: Never Used     Alcohol use Yes      Comment: once a week 2-3 glasses wine     Alcohol Use 4/24/2018   If you drink alcohol do you typically have greater than 3 drinks per day OR greater than 7 drinks per week? No   No flowsheet data found.    Reviewed orders with patient.  Reviewed health maintenance and updated orders accordingly - Yes        Pertinent mammograms are reviewed under the imaging tab.  History of abnormal Pap smear: YES - other categories - see link Cervical Cytology Screening Guidelines    Reviewed and updated as needed this visit by clinical staff  Tobacco  Allergies  Meds  Med Hx  Surg Hx  Fam Hx  Soc Hx        Reviewed and updated as needed this visit by Provider  Tobacco  Med Hx  Surg Hx  Fam Hx  Soc Hx           Review of Systems  CONSTITUTIONAL: NEGATIVE for fever, chills, unexplained change in  "weight  INTEGUMENTARY/SKIN: NEGATIVE for worrisome rashes, moles or lesions.    EYES:Positive for seeing the eye doctor every year.  NEGATIVE for vision changes or irritation.  ENT: NEGATIVE for ear, mouth and throat problems  RESP: NEGATIVE for significant cough or SOB  BREAST: NEGATIVE for masses, tenderness or discharge  CV: NEGATIVE for chest pain, palpitations or peripheral edema  GI: NEGATIVE for nausea, abdominal pain, heartburn, or change in bowel habits  : NEGATIVE for unusual urinary or vaginal symptoms. Her periods are shorter but they are heavier.  MUSCULOSKELETAL: NEGATIVE for significant arthralgias or myalgia  NEURO: NEGATIVE for weakness, dizziness or paresthesias  PSYCHIATRIC: NEGATIVE for changes in mood or affect         OBJECTIVE:   /78  Pulse 96  Temp 97.1  F (36.2  C) (Temporal)  Ht 5' 0.25\" (1.53 m)  Wt 162 lb 6.4 oz (73.7 kg)  LMP 04/13/2018 (Approximate)  SpO2 99%  Breastfeeding? No  BMI 31.45 kg/m2  Physical Exam  GENERAL: healthy, alert and no distress  EYES: Eyes grossly normal to inspection, PERRL and conjunctivae and sclerae normal.   HENT: ear canals and TM's normal, nose and mouth without ulcers or lesions  NECK: no adenopathy, no asymmetry, masses, or scars and thyroid normal to palpation, no bruits.   RESP: lungs clear to auscultation - no rales, rhonchi or wheezes  BREAST: normal without masses, tenderness or nipple discharge and no palpable axillary masses or adenopathy  CV: regular rate and rhythm, normal S1 S2, no S3 or S4, no murmur, click or rub, no peripheral edema and peripheral pulses strong  ABDOMEN: soft, nontender, no hepatosplenomegaly, no masses and bowel sounds normal  Vaginal exam reveals normal external and internal genitalia.  Cervix is closed, long and thick.  No lesions or abnormalities seen.  Bimanual exam reveals a nontender, nongravid uterus with no CMT.  No adnexal masses or tenderness.     MS: no gross musculoskeletal defects noted, no " "edema  SKIN: no suspicious lesions or rashes, Flat brown macule on left shoulder.   NEURO: Normal strength and tone, mentation intact and speech normal  PSYCH: mentation appears normal, affect normal/bright    Orders Placed This Encounter   Procedures     MA Screen Bilateral w/Zac     Pap imaged thin layer screen with HPV - recommended age 30 - 65 years (select HPV order below)     HPV High Risk Types DNA Cervical        ASSESSMENT/PLAN:       ICD-10-CM    1. Encounter for routine adult health examination without abnormal findings Z00.00 ferrous gluconate (FERGON) 324 (38 Fe) MG tablet   2. Screening for malignant neoplasm of cervix Z12.4 Pap imaged thin layer screen with HPV - recommended age 30 - 65 years (select HPV order below)     HPV High Risk Types DNA Cervical   3. Chronic seasonal allergic rhinitis, unspecified trigger J30.2    4. Screening for malignant neoplasm of breast Z12.31 MA Screen Bilateral w/Zac   5. Class 1 obesity due to excess calories without serious comorbidity with body mass index (BMI) of 31.0 to 31.9 in adult E66.09     Z68.31      - pap collected today 4/27/2018, will repeat q 3 years if normal.   - Labs UTD  - Mammogram ordered as desired, she prefers to wait until 45 due to radiation risk.   - Colonoscopy Not indicated due to age   - Dexa Not indicated due to age   -Suggested thyroid check every 3-5 years for screening due to family history   - Vaccinations UTD  - Follow up for routine physical in 1 year, or sooner if needed.     COUNSELING:  Reviewed preventive health counseling, as reflected in patient instructions       Regular exercise       Healthy diet/nutrition       Vision screening         reports that she has never smoked. She has never used smokeless tobacco.    Estimated body mass index is 31.45 kg/(m^2) as calculated from the following:    Height as of this encounter: 5' 0.25\" (1.53 m).    Weight as of this encounter: 162 lb 6.4 oz (73.7 kg).   Weight management plan: " Discussed healthy diet and exercise guidelines and patient will follow up in 12 months in clinic to re-evaluate.   Encourage daily exercise 30+ minutes of vigorous aerobic exercise, healthy low fat, low carb diet.     Counseling Resources:  ATP IV Guidelines  Pooled Cohorts Equation Calculator  Breast Cancer Risk Calculator  FRAX Risk Assessment  ICSI Preventive Guidelines  Dietary Guidelines for Americans, 2010  USDA's MyPlate  ASA Prophylaxis  Lung CA Screening    Tamara Griffin MD  [facility]

## 2018-04-29 PROBLEM — E66.811 CLASS 1 OBESITY DUE TO EXCESS CALORIES WITHOUT SERIOUS COMORBIDITY WITH BODY MASS INDEX (BMI) OF 31.0 TO 31.9 IN ADULT: Status: ACTIVE | Noted: 2018-04-29

## 2018-04-29 PROBLEM — E66.09 CLASS 1 OBESITY DUE TO EXCESS CALORIES WITHOUT SERIOUS COMORBIDITY WITH BODY MASS INDEX (BMI) OF 31.0 TO 31.9 IN ADULT: Status: ACTIVE | Noted: 2018-04-29

## 2018-05-01 LAB
COPATH REPORT: NORMAL
PAP: NORMAL

## 2018-05-03 LAB
FINAL DIAGNOSIS: NORMAL
HPV HR 12 DNA CVX QL NAA+PROBE: NEGATIVE
HPV16 DNA SPEC QL NAA+PROBE: NEGATIVE
HPV18 DNA SPEC QL NAA+PROBE: NEGATIVE
SPECIMEN DESCRIPTION: NORMAL
SPECIMEN SOURCE CVX/VAG CYTO: NORMAL

## 2018-08-11 ENCOUNTER — VIRTUAL VISIT (OUTPATIENT)
Dept: FAMILY MEDICINE | Facility: OTHER | Age: 43
End: 2018-08-11

## 2018-08-11 NOTE — PROGRESS NOTES
"Date:   Clinician: Aristeo Hampton  Clinician NPI: [id number]  Patient: Kristina Riedel  Patient : 1975  Patient Address: [address] [place], [place] [address], [place] [address]  Patient Phone: [phone]  Visit Protocol: General skin conditions  Patient Summary:  Laya is a 42 year old ( : 1975 ) female who initiated a Visit for evaluation of an unspecified skin condition. When asked the question \"Please sign me up to receive news, health information and promotions. \", Laya responded \"Yes\".    Images of her skin condition were uploaded.  Her symptoms started 4-6 days ago and affect the right side of her body. The skin condition is located on her face. The skin condition is red and yellow in color.   The affected area has crusts, sores, and drainage. It feels tender to touch and itchy. The symptoms do not interfere with her sleep.   Symptom details     Redness: The redness has not rapidly increased in size.    Drainage: The color of the drainage is yellow.     The skin condition has not changed since the symptoms started.   Denied symptoms include blisters, dry/flaky skin, hives, pimples, warm to touch, numbness, pain, burning, and scabs. Laya does not feel feverish. She does not have a rash in the shape of a bull's-eye.   Treatments or home remedies used to relieve the symptoms as reported by the patient (free text): Triple antibiotic cream partially helped, but not clearing the impetigo up   Precipitating events  Laya has been in close contact with someone that has similar symptoms. She went swimming, traveled, and spent excess time in the sun just before her symptoms started. Location traveled as reported by the patient (free text): [facility] to [facility]   Laya did not come in contact with any irritants prior to the onset of her symptoms. Laya did not get bitten or stung by an insect.   Pertinent medical history  Laya has experienced this skin condition before. " Her current skin condition does not come and go. The last time she experienced this skin condition was more than a year ago.   Laya has had chickenpox, but has not had shingles in the past.    Laya does not have a history or a family history of atopia. Ongoing medical conditions were denied.   Weight: 160 lbs   Laya does not smoke or use smokeless tobacco.   She denies pregnancy and denies breastfeeding. She is currently menstruating.   Additional information as reported by the patient (free text): Have impetigo need RX for Bactroban, triple antibiotic cream is not clearing it up. Tx   MEDICATIONS: No current medications, ALLERGIES: NKDA  Clinician Response:  Dear Laya,   Based on the information provided, you have impetigo. Impetigo is a very contagious skin infection that usually develops through an open sore, bug bite, scratch, or another type of rash. It can, however, occur on normal, uninjured skin as well.   Impetigo causes red, itchy sores that form a yellow-brown crust when they break open. The skin will heal without scarring. The face, arms, and legs are the skin areas most often affected.  Medication information  I am prescribing:       Mupirocin calcium (Bactroban) 2% topical cream. Apply to the affected area 3 times per day for 7-14 days. There are no refills with this prescription.      Mupirocin 2% topical ointment. Apply a small amount of ointment to the affected area 3 times per day for 10 days. There are no refills with this prescription.     Self care  Touching the rash or an object that was in contact with the rash can spread the infection to other parts of your body and to other people. Wash your hands after touching the rash, and wash clothing, towels, and bedsheets often.  Steps you can take to be as comfortable as possible:     Avoid touching the sores    Wash as you normally would, but be sure the involved area of dried very well when finished    Use mild soap and laundry  detergent    Choose clothing and bedding made of a breathable material like cotton    Do not use antibiotic creams or ointments unless recommended by a provider     When to seek care  Please make an appointment to be seen in a clinic or urgent care if any of the following occur:     Symptoms do not start to improve after 3 days of treatment    New symptoms develop, or symptoms become worse    Symptoms are so severe that you are unable to sleep or do regular activities    You notice symptoms of a skin infection (spreading redness, pain that is not improving, fever, warmth)     Call your clinic or go to an urgent care if you have problems breathing, blood in the urine, increased blood pressure or swelling in any part of the body. These are signs of a rare but serious problem called post-streptococcal glomerulonephritis, which can happen after a skin infection such as impetigo.   Diagnosis: Impetigo  Diagnosis ICD: L01.00  Additional Clinician Notes: I've given you a cream and an ointment.   You can get either one but sometimes insurance companies usually only cover one or the other.  Ask your pharmacist which one is covered and fill the script that's cheapest.  Prescription: mupirocin calcium (Bactroban) 2 % topical cream 1 15 gram tube, 14 days supply. Apply to the affected area 3 times per day for 7-14 days. Refills: 0, Refill as needed: no, Allow substitutions: yes  Prescription: mupirocin 2 % topical ointment 1 22 gram tube, 10 days supply. Apply a small amount of ointment to the affected area 3 times per day for 10 days. Refills: 0, Refill as needed: no, Allow substitutions: yes  Pharmacy: [facility] - [phone] - [address], [place] [address]

## 2019-04-25 ENCOUNTER — VIRTUAL VISIT (OUTPATIENT)
Dept: FAMILY MEDICINE | Facility: OTHER | Age: 44
End: 2019-04-25

## 2019-04-25 NOTE — PROGRESS NOTES
"Date:   Clinician: Aristeo Hampton  Clinician NPI: [id number]  Patient: Kristina Riedel  Patient : 1975  Patient Address: [address] [place] [id number]  Patient Phone: [phone]  Visit Protocol: URI  Patient Summary:  Laya is a 43 year old ( : 1975 ) female who initiated a Visit for cold, sinus infection, or influenza. When asked the question \"Please sign me up to receive news, health information and promotions. \", Laya responded \"No\".    Laya states her symptoms started gradually 10-13 days ago.   Her symptoms consist of malaise, facial pain or pressure, a cough, nasal congestion, myalgia, enlarged lymph nodes, a headache, and a sore throat. Laya also feels feverish.   Symptom details     Nasal secretions: The color of her mucus is yellow.    Cough: Laya coughs almost every minute and her cough is more bothersome at night. Phlegm comes into her throat when she coughs. She believes the phlegm causes the cough. The color of the phlegm is yellow.     Sore throat: Laya reports having mild throat pain (1-3 on a 10 point pain scale), does not have exudate on her tonsils, and can swallow liquids. The lymph nodes in her neck are enlarged. A rash has not appeared on the skin since the sore throat started.     Temperature: Her current temperature is 101.1 degrees Fahrenheit. Laya has had a temperature over 100 degrees Fahrenheit for 1-2 days.     Facial pain or pressure: The facial pain or pressure does not feel worse when bending or leaning forward.     Headache: She states the headache is mild (1-3 on a 10 point pain scale).      Laya denies having chills, rhinitis, wheezing, teeth pain, and ear pain. She also denies double sickening (worsening symptoms after initial improvement), taking antibiotic medication for the symptoms, and having recent facial or sinus surgery in the past 60 days. She is not experiencing dyspnea.   Within the past week, " Laya has not been exposed to someone with strep throat. She has not recently been exposed to someone with influenza. Laya has been in close contact with the following high risk individuals: adults 65 or older and children under the age of 5.   Laya had 1 sinus infection within the past year.   Weight: 170 lbs   Laya does not smoke or use smokeless tobacco.   She denies pregnancy and denies breastfeeding. She is currently menstruating.   MEDICATIONS: No current medications, ALLERGIES: NKDA  Clinician Response:  Dear Laya,  Based on the information provided, you have acute bacterial sinusitis, also known as a sinus infection. Sinus infections are caused by bacteria or a virus and symptoms are almost always identical. The difference between the 2 types of infections is timing.  Sinus infections start as viral infections and symptoms improve on their own in about 7 days. If symptoms have not improved after 7 days or have even worsened, a bacterial infection may have developed.  Medication information  I am prescribing:     Amoxicillin 500 mg oral tablet. Take 1 tablet by mouth every 8 hours for 10 days. There are no refills with this prescription.   Antibiotics can cause an allergic reaction even if you have taken them without a problem in the past. If you develop a new rash, swelling, or difficulty breathing, stop the medication and be seen in a clinic or urgent care immediately.  A yeast infection is a side effect of taking antibiotics in some women. Please use [facility] to get treatment if you have symptoms of a yeast infection.  If you become pregnant during this course of treatment, stop taking the medication and contact your primary care provider.  Self care  The following tips will keep you as comfortable as possible while you recover:     Rest    Drink plenty of water and other liquids    Take a hot shower to loosen congestion    Use throat lozenges    Gargle with warm salt water (1/4 teaspoon  of salt per 8 ounce glass of water)    Suck on frozen items such as popsicles or ice cubes    Drink hot tea with lemon and honey    Take a spoonful of honey to reduce your cough     When to seek care  Please be seen in a clinic or urgent care if any of the following occur:     Symptoms do not start to improve after 3 days of treatment    New symptoms develop, or symptoms become worse     Call 911 or go to the emergency room if you feel that your throat is closing off, you suddenly develop a rash, you are unable to swallow fluids, you are drooling, or you are having difficulty breathing.   Diagnosis: Acute bacterial sinusitis  Diagnosis ICD: J01.90  Prescription: amoxicillin 500 mg oral tablet 30 tablet, 10 days supply. Take 1 tablet by mouth every 8 hours for 10 days. Refills: 0, Refill as needed: no, Allow substitutions: yes  Pharmacy: [facility] - [phone] - [address] [phone]

## 2019-04-26 ENCOUNTER — HOSPITAL ENCOUNTER (OUTPATIENT)
Dept: MAMMOGRAPHY | Facility: CLINIC | Age: 44
Discharge: HOME | End: 2019-04-26
Attending: FAMILY MEDICINE | Admitting: FAMILY MEDICINE
Payer: COMMERCIAL

## 2019-04-26 DIAGNOSIS — Z12.39 SCREENING FOR MALIGNANT NEOPLASM OF BREAST: ICD-10-CM

## 2019-04-26 PROCEDURE — 77063 BREAST TOMOSYNTHESIS BI: CPT

## 2019-10-07 ENCOUNTER — OFFICE VISIT (OUTPATIENT)
Dept: FAMILY MEDICINE | Facility: CLINIC | Age: 44
End: 2019-10-07
Payer: COMMERCIAL

## 2019-10-07 VITALS
BODY MASS INDEX: 32.67 KG/M2 | WEIGHT: 166.4 LBS | SYSTOLIC BLOOD PRESSURE: 124 MMHG | RESPIRATION RATE: 12 BRPM | TEMPERATURE: 97.9 F | HEIGHT: 60 IN | DIASTOLIC BLOOD PRESSURE: 62 MMHG | OXYGEN SATURATION: 100 % | HEART RATE: 84 BPM

## 2019-10-07 DIAGNOSIS — N92.0 MENORRHAGIA WITH REGULAR CYCLE: ICD-10-CM

## 2019-10-07 DIAGNOSIS — Z23 NEED FOR PROPHYLACTIC VACCINATION AND INOCULATION AGAINST INFLUENZA: ICD-10-CM

## 2019-10-07 DIAGNOSIS — Z00.00 ROUTINE GENERAL MEDICAL EXAMINATION AT A HEALTH CARE FACILITY: Primary | ICD-10-CM

## 2019-10-07 LAB
FERRITIN SERPL-MCNC: 140 NG/ML (ref 12–150)
HGB BLD-MCNC: 13.6 G/DL (ref 11.7–15.7)
IRON SATN MFR SERPL: 23 % (ref 15–46)
IRON SERPL-MCNC: 84 UG/DL (ref 35–180)
TIBC SERPL-MCNC: 363 UG/DL (ref 240–430)

## 2019-10-07 PROCEDURE — 99213 OFFICE O/P EST LOW 20 MIN: CPT | Mod: 25 | Performed by: FAMILY MEDICINE

## 2019-10-07 PROCEDURE — 82728 ASSAY OF FERRITIN: CPT | Performed by: FAMILY MEDICINE

## 2019-10-07 PROCEDURE — 90471 IMMUNIZATION ADMIN: CPT | Performed by: FAMILY MEDICINE

## 2019-10-07 PROCEDURE — 85018 HEMOGLOBIN: CPT | Performed by: FAMILY MEDICINE

## 2019-10-07 PROCEDURE — 99396 PREV VISIT EST AGE 40-64: CPT | Mod: 25 | Performed by: FAMILY MEDICINE

## 2019-10-07 PROCEDURE — 36415 COLL VENOUS BLD VENIPUNCTURE: CPT | Performed by: FAMILY MEDICINE

## 2019-10-07 PROCEDURE — 83550 IRON BINDING TEST: CPT | Performed by: FAMILY MEDICINE

## 2019-10-07 PROCEDURE — 83540 ASSAY OF IRON: CPT | Performed by: FAMILY MEDICINE

## 2019-10-07 PROCEDURE — 90686 IIV4 VACC NO PRSV 0.5 ML IM: CPT | Performed by: FAMILY MEDICINE

## 2019-10-07 RX ORDER — FERROUS GLUCONATE 324(38)MG
TABLET ORAL
COMMUNITY
Start: 2019-10-07 | End: 2023-05-02

## 2019-10-07 ASSESSMENT — MIFFLIN-ST. JEOR: SCORE: 1326.91

## 2019-10-07 NOTE — PATIENT INSTRUCTIONS
I will notify you with lab results from today.    I ordered a pelvic ultrasound to investigate the clotting during your period. Please schedule this at your convenience.       Preventive Health Recommendations  Female Ages 40 to 49    Yearly exam:     See your health care provider every year in order to  1. Review health changes.   2. Discuss preventive care.    3. Review your medicines if your doctor prescribed any.      Get a Pap test every three years (unless you have an abnormal result and your provider advises testing more often).      If you get Pap tests with HPV test, you only need to test every 5 years, unless you have an abnormal result. You do not need a Pap test if your uterus was removed (hysterectomy) and you have not had cancer.      You should be tested each year for STDs (sexually transmitted diseases), if you're at risk.     Ask your doctor if you should have a mammogram.      Have a colonoscopy (test for colon cancer) if someone in your family has had colon cancer or polyps before age 50.       Have a cholesterol test every 5 years.       Have a diabetes test (fasting glucose) after age 45. If you are at risk for diabetes, you should have this test every 3 years.    Shots: Get a flu shot each year. Get a tetanus shot every 10 years.     Nutrition:     Eat at least 5 servings of fruits and vegetables each day.    Eat whole-grain bread, whole-wheat pasta and brown rice instead of white grains and rice.    Get adequate Calcium and Vitamin D.      Lifestyle    Exercise at least 150 minutes a week (an average of 30 minutes a day, 5 days a week). This will help you control your weight and prevent disease.    Limit alcohol to one drink per day.    No smoking.     Wear sunscreen to prevent skin cancer.    See your dentist every six months for an exam and cleaning.

## 2019-10-07 NOTE — PROGRESS NOTES
"   SUBJECTIVE:   CC: Kristina K Riedel is an 44 year old woman who presents for preventive health visit.     Healthy Habits:    Getting at least 3 servings of Calcium per day:  Yes    Bi-annual eye exam:  Yes    Dental care twice a year:  Yes    Sleep apnea or symptoms of sleep apnea:  None    Diet:  Regular (no restrictions)    Frequency of exercise:  4-5 days/week    Duration of exercise:  15-30 minutes    Taking medications regularly:  Not Applicable    Barriers to taking medications:  Not applicable    Medication side effects:  Not applicable    PHQ-2 Total Score:    Additional concerns today:  Yes (possible pre menopause )      CONCERNS: She is concerned that she is experiencing perimenopause symptoms. She is still having a regular, monthly menstrual period. She says that recently, she has been experiencing blood clots that look like \"pieces of tissue with blood\" right before her regular menstrual period starts. She also notes some new menstrual cramping that she hasn't experienced before. She complains of hot flashes in both the day and night, more frequent at night. All of her symptoms are tolerable, but she is concerned about the clots because \"they may be cancer or something more serious\". She is taking two 324 mg iron pills in the morning and three 324 mg iron pills at night because she \"feels anemic\" during her period. She is not at risk for pregnancy as her partner has had a vasectomy. She had a normal PAP in 4/2018.       Today's PHQ-2 Score:   PHQ-2 ( 1999 Pfizer) 10/7/2019   Q1: Little interest or pleasure in doing things 0   Q2: Feeling down, depressed or hopeless 0   PHQ-2 Score 0   Q1: Little interest or pleasure in doing things -   Q2: Feeling down, depressed or hopeless -   PHQ-2 Score -       Abuse: Current or Past(Physical, Sexual or Emotional)- No  Do you feel safe in your environment? Yes    Social History     Tobacco Use     Smoking status: Never Smoker     Smokeless tobacco: Never Used "   Substance Use Topics     Alcohol use: Yes     Comment: once a week 2-3 glasses wine     Reviewed orders with patient.  Reviewed health maintenance and updated orders accordingly - Yes    Patient Active Problem List   Diagnosis     CARDIOVASCULAR SCREENING; LDL GOAL LESS THAN 160     Seasonal allergic rhinitis     Umbilical hernia without obstruction and without gangrene     Class 1 obesity due to excess calories without serious comorbidity with body mass index (BMI) of 31.0 to 31.9 in adult     Past Surgical History:   Procedure Laterality Date     HC TOOTH EXTRACTION W/FORCEP      [place] tooth extraction     HERNIORRHAPHY UMBILICAL N/A 4/3/2017    Procedure: HERNIORRHAPHY UMBILICAL;  Surgeon: Ines Conley MD;  Location:  OR       Social History     Tobacco Use     Smoking status: Never Smoker     Smokeless tobacco: Never Used   Substance Use Topics     Alcohol use: Yes     Comment: once a week 2-3 glasses wine     Family History   Problem Relation Age of Onset     Hypertension Father      Thyroid Disease Paternal Grandmother         hypothyroid         Current Outpatient Medications   Medication Sig Dispense Refill     Acetaminophen (TYLENOL PO)        Calcium Carbonate-Vitamin D (CALCIUM 500 + D PO) Take  by mouth.       ferrous gluconate (FERGON) 324 (38 Fe) MG tablet 2 pills in am, 3 pills in evening       MULTI-VITAMIN/IRON OR TABS 1 tablet daily       OMEGA 3 120-180 MG OR CAPS None Entered       VITAMIN D PO Take 600 Units by mouth Reported on 4/19/2017       vitamin E 400 UNIT capsule Take 400 Units by mouth daily       ZINC ACETATE (ORAL) OR 1 CAPSULE 3 TIMES DAILY ON EMPTY STOMACH       No Known Allergies    Mammogram Screening: Patient under age 50, mutual decision reflected in health maintenance.      Pertinent mammograms are reviewed under the imaging tab.  History of abnormal Pap smear: NO - age 30- 65 PAP every 3 years recommended  PAP / HPV Latest Ref Rng & Units 4/27/2018 6/3/2014 5/18/2011  "  PAP - NIL NIL NIL   HPV 16 DNA NEG:Negative Negative - -   HPV 18 DNA NEG:Negative Negative - -   OTHER HR HPV NEG:Negative Negative - -     Reviewed and updated as needed this visit by clinical staff  Tobacco  Allergies  Meds  Med Hx  Surg Hx  Fam Hx  Soc Hx        Reviewed and updated as needed this visit by Provider  Tobacco  Allergies  Meds  Problems  Med Hx  Surg Hx  Fam Hx        Past Medical History:   Diagnosis Date     NVD (normal vaginal delivery)     x3      Past Surgical History:   Procedure Laterality Date     HC TOOTH EXTRACTION W/FORCEP      [place] tooth extraction     HERNIORRHAPHY UMBILICAL N/A 4/3/2017    Procedure: HERNIORRHAPHY UMBILICAL;  Surgeon: Ines Conley MD;  Location: [facility]       Review of Systems  CONSTITUTIONAL: NEGATIVE for fever, chills, change in weight  INTEGUMENTARU/SKIN: NEGATIVE for worrisome rashes, moles or lesions  EYES: NEGATIVE for vision changes or irritation  ENT: NEGATIVE for ear, mouth and throat problems  RESP: NEGATIVE for significant cough or SOB  BREAST: NEGATIVE for masses, tenderness or discharge  CV: NEGATIVE for chest pain, palpitations or peripheral edema  GI: NEGATIVE for nausea, abdominal pain, heartburn, or change in bowel habits  : NEGATIVE for unusual urinary or vaginal symptoms except as above. Periods are heavy but regular - see \"concerns\" above.   MUSCULOSKELETAL: NEGATIVE for significant arthralgias or myalgia  NEURO: NEGATIVE for weakness, dizziness or paresthesias  PSYCHIATRIC: NEGATIVE for changes in mood or affect     OBJECTIVE:   /62   Pulse 84   Temp 97.9  F (36.6  C) (Temporal)   Resp 12   Ht 1.525 m (5' 0.04\")   Wt 75.5 kg (166 lb 6.4 oz)   LMP 09/24/2019   SpO2 100%   BMI 32.46 kg/m    Physical Exam  GENERAL: healthy, alert and no distress  EYES: Eyes grossly normal to inspection, PERRL and conjunctivae and sclerae normal  HENT: ear canals and TM's normal, nose and mouth without ulcers or lesions  NECK: no " adenopathy, no asymmetry, masses, or scars and thyroid normal to palpation  RESP: lungs clear to auscultation - no rales, rhonchi or wheezes  BREAST: Patient declines a breast exam.   CV: regular rate and rhythm, normal S1 S2, no S3 or S4, no murmur, click or rub, no peripheral edema and peripheral pulses strong  ABDOMEN: soft, nontender, no hepatosplenomegaly, no masses and bowel sounds normal  PELVIC: Pelvic Exam declined due to previous negative.    MS: no gross musculoskeletal defects noted, no edema  SKIN: Irregular, flat, light brown macule on the left posterior axilla. no suspicious lesions or rashes  NEURO: Normal strength and tone, mentation intact and speech normal  PSYCH: mentation appears normal, affect normal/bright    Diagnostic Test Results:  Orders Placed This Encounter   Procedures     US Pelvic Complete w Transvaginal     INFLUENZA VACCINE IM > 6 MONTHS VALENT IIV4 [74999]     1st  Administration  [86246]     Iron and iron binding capacity     Ferritin     Hemoglobin       ASSESSMENT/PLAN:       ICD-10-CM    1. Routine general medical examination at a health care facility Z00.00    2. Menorrhagia with regular cycle N92.0 US Pelvic Complete w Transvaginal     ferrous gluconate (FERGON) 324 (38 Fe) MG tablet     Iron and iron binding capacity     Ferritin     Hemoglobin   3. Need for prophylactic vaccination and inoculation against influenza Z23 INFLUENZA VACCINE IM > 6 MONTHS VALENT IIV4 [23775]     1st  Administration  [[id number]]     - PAP was NIL in 4/27/2018. Will repeat in three years, in 2021.   - Labs collected as above, will notify with results and discuss further evaluation/ treatment if needed at that time. Will check iron levels due to menstrual bleeding and high dose of daily iron.   - No refills needed today.   - Mammogram was negative in 4/26/2019. Will continue with mammography screening every 1-2 years until age 50, at which time it will become yearly. Continue with routine breast exams  "and notify me with concerns.   - Colonoscopy recommended at age 50. Patient will notify me with any concerns in the mean time.   - Pelvic U/S ordered to investigate the clotting she experiences during her menstrual period. Will notify with results. Discussed that if her symptoms are tolerable, she can just monitor them for now, may be a \"normal\" part of perimenopause, as long as her ultrasound is normal. She will notify me with any concerns.   - Influenza Vaccination given today. Vaccinations are otherwise UTD.  - Follow up for routine physical in 1 year, or sooner if needed.     COUNSELING:  Reviewed preventive health counseling, as reflected in patient instructions       Regular exercise       Healthy diet/nutrition       Vision screening       Hearing screening       (Gill)menopause management    Estimated body mass index is 32.46 kg/m  as calculated from the following:    Height as of this encounter: 1.525 m (5' 0.04\").    Weight as of this encounter: 75.5 kg (166 lb 6.4 oz).    Weight management plan: Discussed healthy diet and exercise guidelines     reports that she has never smoked. She has never used smokeless tobacco.      Counseling Resources:  ATP IV Guidelines  Pooled Cohorts Equation Calculator  Breast Cancer Risk Calculator  FRAX Risk Assessment  ICSI Preventive Guidelines  Dietary Guidelines for Americans, 2010  USDA's MyPlate  ASA Prophylaxis  Lung CA Screening    This document serves as a record of services personally performed by Tamara Griffin MD. It was created on their behalf by Linda Morris, a trained medical scribe. The creation of this record is based on the provider's personal observations and the statements of the patient. This document has been checked and approved by the attending provider.    Tamara Griffin MD  [facility]  "

## 2019-10-07 NOTE — RESULT ENCOUNTER NOTE
Laya, here are your lab results. Your iron levels are great.  Not too high, not low at all. You could drop your iron a little.  Since you're not running high, I am okay with staying on your current dose as well if that is what you prefer.  Eventually you will need to go down on it when you have fewer periods or less heavy periods because after menopause, it is higher risk for women to overdose on iron.  Tamara Griffin MD

## 2019-10-08 ENCOUNTER — MYC MEDICAL ADVICE (OUTPATIENT)
Dept: FAMILY MEDICINE | Facility: CLINIC | Age: 44
End: 2019-10-08

## 2019-10-08 DIAGNOSIS — N92.0 MENORRHAGIA WITH REGULAR CYCLE: Primary | ICD-10-CM

## 2019-10-08 NOTE — TELEPHONE ENCOUNTER
Patient messaging and requesting that the order for Pelvic US be faxed to [facility]. Order has been faxed. Lia Barahona LPN

## 2019-10-09 NOTE — TELEPHONE ENCOUNTER
Tamia from [facility] calling. They would also like orders for transvaginal & doppler as needed. Please fax to # below or she can take verbal at [phone].  Thank you,  Vanessa Walters- Patient Representative

## 2019-10-14 NOTE — TELEPHONE ENCOUNTER
Tamia calls again to check on the add on of a Doppler to the order for Laya's Transvaginal US Complete.    They will be ok with a verbal order.  Please call [phone].  They are waiting to schedule Laya's appointment until they receive this additional order.

## 2019-10-14 NOTE — TELEPHONE ENCOUNTER
[facility] is messaging that they are also wanting order for doppler. Order placed and pending provider to review and sign. Please advise once signed, it will need to be faxed to [facility]. Lia Barahona LPN

## 2019-10-24 ENCOUNTER — TRANSFERRED RECORDS (OUTPATIENT)
Dept: HEALTH INFORMATION MANAGEMENT | Facility: CLINIC | Age: 44
End: 2019-10-24

## 2019-11-06 ENCOUNTER — HEALTH MAINTENANCE LETTER (OUTPATIENT)
Age: 44
End: 2019-11-06

## 2020-07-15 ENCOUNTER — APPOINTMENT (OUTPATIENT)
Dept: LAB | Facility: CLINIC | Age: 45
End: 2020-07-15
Payer: COMMERCIAL

## 2020-11-29 ENCOUNTER — HEALTH MAINTENANCE LETTER (OUTPATIENT)
Age: 45
End: 2020-11-29

## 2020-11-29 NOTE — NURSING NOTE
"Chief Complaint   Patient presents with     Physical       Initial /78  Pulse 96  Temp 97.1  F (36.2  C) (Temporal)  Ht 5' 0.25\" (1.53 m)  Wt 162 lb 6.4 oz (73.7 kg)  LMP 04/13/2018 (Approximate)  SpO2 99%  Breastfeeding? No  BMI 31.45 kg/m2 Estimated body mass index is 31.45 kg/(m^2) as calculated from the following:    Height as of this encounter: 5' 0.25\" (1.53 m).    Weight as of this encounter: 162 lb 6.4 oz (73.7 kg).  Medication Reconciliation: complete   Celine Silva CMA    "
No

## 2021-02-14 ENCOUNTER — HEALTH MAINTENANCE LETTER (OUTPATIENT)
Age: 46
End: 2021-02-14

## 2021-09-19 ENCOUNTER — HEALTH MAINTENANCE LETTER (OUTPATIENT)
Age: 46
End: 2021-09-19

## 2021-11-14 ENCOUNTER — E-VISIT (OUTPATIENT)
Dept: URGENT CARE | Facility: CLINIC | Age: 46
End: 2021-11-14
Payer: COMMERCIAL

## 2021-11-14 DIAGNOSIS — Z20.822 EXPOSURE TO COVID-19 VIRUS: ICD-10-CM

## 2021-11-14 DIAGNOSIS — Z20.822 SUSPECTED COVID-19 VIRUS INFECTION: ICD-10-CM

## 2021-11-14 PROCEDURE — 99421 OL DIG E/M SVC 5-10 MIN: CPT | Performed by: PHYSICIAN ASSISTANT

## 2021-11-14 NOTE — PATIENT INSTRUCTIONS
When to Use Antibiotics    Antibiotics are medicines used to treat infections caused by bacteria. They don t work for an illness caused by a virus. And they don't work for an allergic reaction. In fact, taking antibiotics for reasons other than an infection by bacteria can cause problems. You may have side effects from the medicine. And if you need an antibiotic in the future, it may not work well. This is because the bacteria can become immune to the medicine. You can also get a type of diarrhea that's hard to treat. This diarrhea is called C. diff.   When antibiotics likely won t help  Your healthcare provider won t usually give you antibiotics for the conditions listed below. You can help by not asking for them if you have:     A cold. This type of illness is caused by a virus. It can cause a runny nose, stuffed-up nose, sneezing, coughing, and headache. You may also have mild body aches and low fever. A cold gets better on its own in a few days to a week.    The flu (influenza). This is a respiratory illness caused by a virus. The flu usually goes away on its own in a week or so. It can cause fever, body aches, sore throat, and tiredness.    Bronchitis. This is an infection in the lungs. It is most often caused by a virus. You may have coughing, phlegm, body aches, and a low fever. A common type of bronchitis is known as a chest cold. This is called acute bronchitis. This often happens after you have a respiratory infection like a cold. Bronchitis can take weeks to go away. Antibiotics often don t help.    Most sore throats. Sore throats are most often caused by viruses. Your throat may feel scratchy or achy. It may hurt to swallow. You may also have a low fever and body aches. A sore throat usually gets better in a few days.    Most outer ear infections. An ear infection may be caused by a virus or bacteria. It causes pain in the ear. Antibiotics by mouth usually don t help. Low-dose antibiotic ear drops  work much better.    Some inner ear infections. An inner ear infection (otitis media) can be caused by a virus in the ear. It can also cause pain and a high fever. Most older children with low-grade fever don't need to be treated with antibiotics.    Most sinus infections. This is also known as sinusitis. This kind of infection causes sinus pain and swelling, and a runny nose. In most cases, it goes away on its own. Antibiotics don t make recovery quicker.    Allergic rhinitis. This is a set of symptoms caused by an allergic reaction. You may have sneezing, a runny nose, itchy or watery eyes, or a sore throat. Allergies are not treated with antibiotics.    Low fever. A mild fever that s less than 100.4 F (38 C) most likely doesn t need to be treated with antibiotics.   When antibiotics can help  Antibiotics can be used to treat:                                                       Strep throat. This is a throat infection caused by a certain type of bacteria. Symptoms of strep throat include a sore throat, white patches on the tonsils, red spots on the roof of the mouth, fever, body aches, and nausea and vomiting. Strep throat almost never causes a cough.    Urinary tract infection (UTI). This is an infection of the bladder and the tube that takes urine out of the body. It is caused by bacteria. It can cause burning pain and urine that s cloudy or tinted with blood. UTIs are very common. Antibiotics usually help treat them.    Some outer ear infections. In some cases, a healthcare provider may prescribe antibiotics by mouth for an ear infection. You may need a test to show the cause of the ear infection.    Some sinus infections. In some cases, your healthcare provider may give you antibiotics. He or she may first need to make sure your symptoms aren t caused by something else. This may be a virus, fungus, allergies, or air pollutants such as smoke.   Your healthcare provider may give you antibiotics if you have a  condition that can affect your immune system. This includes diabetes or cancer.  Self-care at home  If your infection can t be treated with antibiotics, you can take other steps to feel better. Try the remedies below. In general:     Rest and sleep as much as needed.    Drink water and other clear fluids.    Don t smoke. Stay away from smoke from other people.    Use over-the-counter medicine such as acetaminophen or ibuprofen to ease pain or fever, as directed by your healthcare provider.  To treat sinus pain or nasal stuffiness:    Put a warm, moist cloth on your face where you feel sinus pain or pressure.    Try a nasal spray with medicine or saline. Use as directed by your healthcare provider.    Breathe in steam from a hot shower.    Use a humidifier or cool mist vaporizer.   To quiet a cough:     Use a humidifier or cool mist vaporizer.    Breathe in steam from a hot shower.    Suck on cough lozenges.   To sooth a sore throat:     Suck on ice chips, frozen ice pops, or lozenges.    Use a sore throat spray.    Use a humidifier or cool mist vaporizer.    Gargle with saltwater.    Drink warm liquids.    Take ibuprofen to reduce swelling and pain.  To ease ear pain:     Hold a warm, moist washcloth on the ear for 10 minutes at a time.  [employer] last reviewed this educational content on 12/1/2019 2000-2021 [employer]. All rights reserved. This information is not intended as a substitute for professional medical care. Always follow your healthcare professional's instructions.        Dear Kristina K Riedel,    Your symptoms show that you may have coronavirus (COVID-19). This illness can cause fever, cough and trouble breathing. Many people get a mild case and get better on their own. Some people can get very sick.    Will I be tested for COVID-19?  We would like to test you for Covid-19 virus. I have placed orders for this test.     To schedule: go to your [employer] home page and scroll down to the  section that says  You have an appointment that needs to be scheduled  and click the large green button that says  Schedule Now  and follow the steps to find the next available openings.    If you are unable to complete these [employer] scheduling steps, please call [phone] to schedule your testing.     Return to work/school/ guidance:  Please let your workplace manager and staffing office know when your quarantine ends     We can t give you an exact date as it depends on the above. You can calculate this on your own or work with your manager/staffing office to calculate this. (For example if you were exposed on 10/4, you would have to quarantine for 14 full days. That would be through 10/18. You could return on 10/19.)      If you receive a positive COVID-19 test result, follow the guidance of the those who are giving you the results. Usually the return to work is 10 (or in some cases 20 days from symptom onset.) If you work at [employer], you must also be cleared by Employee Occupational Health and Safety to return to work.        If you receive a negative COVID-19 test result and did not have a high risk exposure to someone with a known positive COVID-19 test, you can return to work once you're free of fever for 24 hours without fever-reducing medication and your symptoms are improving or resolved.      If you receive a negative COVID-19 test and If you had a high risk exposure to someone who has tested positive for COVID-19 then you can return to work 14 days after your last contact with the positive individual    Note: If you have ongoing exposure to the covid positive person, this quarantine period may be more than 14 days. (For example, if you are continued to be exposed to your child who tested positive and cannot isolate from them, then the quarantine of 7-14 days can't start until your child is no longer contagious. This is typically 10 days from onset of the child's symptoms. So the total  duration may be 17-24 days in this case.)    Sign up for "[employer].   We know it's scary to hear that you might have COVID-19. We want to track your symptoms to make sure you're okay over the next 2 weeks. Please look for an email from "[employer]--this is a free, online program that we'll use to keep in touch. To sign up, follow the link in the email you will receive. Learn more at [email or web address]    How can I take care of myself?    Get lots of rest. Drink extra fluids (unless a doctor has told you not to)    Take Tylenol (acetaminophen) or ibuprofen for fever or pain. If you have liver or kidney problems, ask your family doctor if it's okay to take Tylenol o ibuprofen    If you have other health problems (like cancer, heart failure, an organ transplant or severe kidney disease): Call your specialty clinic if you don't feel better in the next 2 days.    Know when to call 911. Emergency warning signs include:  o Trouble breathing or shortness of breath  o Pain or pressure in the chest that doesn't go away  o Feeling confused like you haven't felt before, or not being able to wake up  o Bluish-colored lips or face    Where can I get more information?  [facility] - About COVID-19:   [email or web address]    CDC - What to Do If You're Sick:   www.cdc.gov/coronavirus/2019-ncov/about/steps-when-sick.html

## 2022-01-09 ENCOUNTER — HEALTH MAINTENANCE LETTER (OUTPATIENT)
Age: 47
End: 2022-01-09

## 2022-03-06 ENCOUNTER — HEALTH MAINTENANCE LETTER (OUTPATIENT)
Age: 47
End: 2022-03-06

## 2022-03-29 ENCOUNTER — HOSPITAL ENCOUNTER (OUTPATIENT)
Dept: MAMMOGRAPHY | Facility: CLINIC | Age: 47
Discharge: HOME | End: 2022-03-29
Attending: FAMILY MEDICINE | Admitting: FAMILY MEDICINE
Payer: COMMERCIAL

## 2022-03-29 DIAGNOSIS — Z12.31 VISIT FOR SCREENING MAMMOGRAM: ICD-10-CM

## 2022-03-29 PROCEDURE — 77067 SCR MAMMO BI INCL CAD: CPT

## 2023-04-16 ENCOUNTER — HEALTH MAINTENANCE LETTER (OUTPATIENT)
Age: 48
End: 2023-04-16

## 2023-05-02 ENCOUNTER — OFFICE VISIT (OUTPATIENT)
Dept: FAMILY MEDICINE | Facility: CLINIC | Age: 48
End: 2023-05-02
Payer: COMMERCIAL

## 2023-05-02 ENCOUNTER — LAB (OUTPATIENT)
Dept: FAMILY MEDICINE | Facility: CLINIC | Age: 48
End: 2023-05-02

## 2023-05-02 VITALS
BODY MASS INDEX: 31.74 KG/M2 | TEMPERATURE: 97.5 F | HEART RATE: 88 BPM | SYSTOLIC BLOOD PRESSURE: 148 MMHG | DIASTOLIC BLOOD PRESSURE: 90 MMHG | OXYGEN SATURATION: 97 % | WEIGHT: 168.13 LBS | HEIGHT: 61 IN

## 2023-05-02 DIAGNOSIS — Z13.6 CARDIOVASCULAR SCREENING; LDL GOAL LESS THAN 160: ICD-10-CM

## 2023-05-02 DIAGNOSIS — Z12.11 SCREEN FOR COLON CANCER: ICD-10-CM

## 2023-05-02 DIAGNOSIS — R03.0 ELEVATED BLOOD PRESSURE READING WITHOUT DIAGNOSIS OF HYPERTENSION: ICD-10-CM

## 2023-05-02 DIAGNOSIS — E66.811 CLASS 1 OBESITY DUE TO EXCESS CALORIES WITHOUT SERIOUS COMORBIDITY WITH BODY MASS INDEX (BMI) OF 31.0 TO 31.9 IN ADULT: ICD-10-CM

## 2023-05-02 DIAGNOSIS — Z00.00 ROUTINE GENERAL MEDICAL EXAMINATION AT A HEALTH CARE FACILITY: Primary | ICD-10-CM

## 2023-05-02 DIAGNOSIS — N92.0 MENORRHAGIA WITH REGULAR CYCLE: ICD-10-CM

## 2023-05-02 DIAGNOSIS — Z12.4 CERVICAL CANCER SCREENING: ICD-10-CM

## 2023-05-02 DIAGNOSIS — E66.09 CLASS 1 OBESITY DUE TO EXCESS CALORIES WITHOUT SERIOUS COMORBIDITY WITH BODY MASS INDEX (BMI) OF 31.0 TO 31.9 IN ADULT: ICD-10-CM

## 2023-05-02 PROCEDURE — G0145 SCR C/V CYTO,THINLAYER,RESCR: HCPCS | Performed by: FAMILY MEDICINE

## 2023-05-02 PROCEDURE — 99386 PREV VISIT NEW AGE 40-64: CPT | Mod: 25 | Performed by: FAMILY MEDICINE

## 2023-05-02 PROCEDURE — 90715 TDAP VACCINE 7 YRS/> IM: CPT | Performed by: FAMILY MEDICINE

## 2023-05-02 PROCEDURE — 87624 HPV HI-RISK TYP POOLED RSLT: CPT | Performed by: FAMILY MEDICINE

## 2023-05-02 PROCEDURE — 90471 IMMUNIZATION ADMIN: CPT | Performed by: FAMILY MEDICINE

## 2023-05-02 RX ORDER — COLLAGEN, HYDROLYSATE (BOVINE) 100 %
POWDER (GRAM) MISCELLANEOUS
COMMUNITY
Start: 2021-09-01

## 2023-05-02 RX ORDER — FERROUS GLUCONATE 324(38)MG
324 TABLET ORAL 2 TIMES DAILY WITH MEALS
COMMUNITY
Start: 2023-05-02

## 2023-05-02 ASSESSMENT — ENCOUNTER SYMPTOMS
JOINT SWELLING: 0
DIZZINESS: 0
BREAST MASS: 0
NAUSEA: 0
COUGH: 0
ABDOMINAL PAIN: 0
CONSTIPATION: 0
DIARRHEA: 0
FREQUENCY: 0
DYSURIA: 0
PALPITATIONS: 0
HEARTBURN: 0
HEMATOCHEZIA: 0
EYE PAIN: 0
FEVER: 0
CHILLS: 0
MYALGIAS: 0
SORE THROAT: 0
PARESTHESIAS: 0
NERVOUS/ANXIOUS: 0
HEMATURIA: 0
ARTHRALGIAS: 0
SHORTNESS OF BREATH: 0
HEADACHES: 0

## 2023-05-02 NOTE — PATIENT INSTRUCTIONS
Please set up a lab appointment in the next few days to week.  Remember to fast (nothing to eat or drink but water or black coffee) for 12 hours prior to the test.      Please check your blood pressure outside of the clinic once or twice per month and record it.   Please update me after a few checks, via [employer].  Our goal is under 130/80.       Preventive Health Recommendations  Female Ages 40 to 49    Yearly exam:   See your health care provider every year in order to  Review health changes.   Discuss preventive care.    Review your medicines if your doctor prescribed any.    Get a Pap test every three years (unless you have an abnormal result and your provider advises testing more often).    If you get Pap tests with HPV test, you only need to test every 5 years, unless you have an abnormal result. You do not need a Pap test if your uterus was removed (hysterectomy) and you have not had cancer.    You should be tested each year for STDs (sexually transmitted diseases), if you're at risk.   Ask your doctor if you should have a mammogram.    Have a colonoscopy (test for colon cancer) if someone in your family has had colon cancer or polyps before age 50.     Have a cholesterol test every 5 years.     Have a diabetes test (fasting glucose) after age 45. If you are at risk for diabetes, you should have this test every 3 years.    Shots: Get a flu shot each year. Get a tetanus shot every 10 years.     Nutrition:   Eat at least 5 servings of fruits and vegetables each day.  Eat whole-grain bread, whole-wheat pasta and brown rice instead of white grains and rice.  Get adequate Calcium and Vitamin D.      Lifestyle  Exercise at least 150 minutes a week (an average of 30 minutes a day, 5 days a week). This will help you control your weight and prevent disease.  Limit alcohol to one drink per day.  No smoking.   Wear sunscreen to prevent skin cancer.  See your dentist every six months for an exam and cleaning.  
no strength deficits were identified

## 2023-05-02 NOTE — PROGRESS NOTES
SUBJECTIVE:   CC: Laya is an 47 year old who presents for preventive health visit.       5/2/2023     1:32 PM   Additional Questions   Roomed by Tita GERMAIN MA   Patient has been advised of split billing requirements and indicates understanding: Yes  Healthy Habits:     Getting at least 3 servings of Calcium per day:  Yes    Bi-annual eye exam:  Yes    Dental care twice a year:  Yes    Sleep apnea or symptoms of sleep apnea:  None    Diet:  Regular (no restrictions)    Frequency of exercise:  4-5 days/week    Duration of exercise:  Greater than 60 minutes    Taking medications regularly:  Yes    Medication side effects:  Not applicable    PHQ-2 Total Score: 0    Additional concerns today:  No        Today's PHQ-2 Score:       5/2/2023     1:40 PM   PHQ-2 ( 1999 [employer])   Q1: Little interest or pleasure in doing things 0   Q2: Feeling down, depressed or hopeless 0   PHQ-2 Score 0   Q1: Little interest or pleasure in doing things Not at all    Not at all   Q2: Feeling down, depressed or hopeless Not at all    Not at all   PHQ-2 Score 0    0       Have you ever done Advance Care Planning? (For example, a Health Directive, POLST, or a discussion with a medical provider or your loved ones about your wishes): Yes, advance care planning is on file.    Social History     Tobacco Use     Smoking status: Never     Smokeless tobacco: Never   Vaping Use     Vaping status: Never Used   Substance Use Topics     Alcohol use: Yes     Comment: once a week 2-3 glasses wine             5/2/2023     1:40 PM   Alcohol Use   Prescreen: >3 drinks/day or >7 drinks/week? No     Reviewed orders with patient.  Reviewed health maintenance and updated orders accordingly - Yes  BP Readings from Last 3 Encounters:   05/02/23 (!) 148/90   10/07/19 124/62   04/27/18 120/78    Wt Readings from Last 3 Encounters:   05/02/23 76.3 kg (168 lb 2 oz)   10/07/19 75.5 kg (166 lb 6.4 oz)   04/27/18 73.7 kg (162 lb 6.4 oz)                  Patient  Active Problem List   Diagnosis     CARDIOVASCULAR SCREENING; LDL GOAL LESS THAN 160     Seasonal allergic rhinitis     Umbilical hernia without obstruction and without gangrene     Class 1 obesity due to excess calories without serious comorbidity with body mass index (BMI) of 31.0 to 31.9 in adult     Menorrhagia with regular cycle     Elevated blood pressure reading without diagnosis of hypertension     Past Surgical History:   Procedure Laterality Date     HC TOOTH EXTRACTION W/FORCEP      Theresa tooth extraction     HERNIORRHAPHY UMBILICAL N/A 4/3/2017    Procedure: HERNIORRHAPHY UMBILICAL;  Surgeon: Ines Conley MD;  Location:  OR       Social History     Tobacco Use     Smoking status: Never     Smokeless tobacco: Never   Vaping Use     Vaping status: Never Used   Substance Use Topics     Alcohol use: Yes     Comment: once a week 2-3 glasses wine     Family History   Problem Relation Age of Onset     Hypertension Father      Thyroid Disease Paternal Grandmother         hypothyroid           Breast Cancer Screening:  Any new diagnosis of family breast, ovarian, or bowel cancer? No    FHS-7:       3/29/2022    11:44 AM   Breast CA Risk Assessment (FHS-7)   Did any of your first-degree relatives have breast or ovarian cancer? No   Did any of your relatives have bilateral breast cancer? No   Did any man in your family have breast cancer? No   Did any woman in your family have breast and ovarian cancer? No   Did any woman in your family have breast cancer before age 50 y? No   Do you have 2 or more relatives with breast and/or ovarian cancer? No   Do you have 2 or more relatives with breast and/or bowel cancer? No         Pertinent mammograms are reviewed under the imaging tab.    History of abnormal Pap smear: NO - age 30- 65 PAP every 3 years recommended      Latest Ref Rng & Units 4/27/2018     8:12 AM 4/27/2018     8:07 AM 6/3/2014    12:00 AM   PAP / HPV   PAP (Historical)   NIL   NIL     HPV 16 DNA  "NEG^Negative Negative       HPV 18 DNA NEG^Negative Negative       Other HR HPV NEG^Negative Negative         Reviewed and updated as needed this visit by clinical staff   Tobacco  Allergies  Meds              Reviewed and updated as needed this visit by Provider    Allergies                Review of Systems   Constitutional: Negative for chills and fever.   HENT: Negative for congestion, ear pain, hearing loss and sore throat.    Eyes: Negative for pain and visual disturbance.   Respiratory: Negative for cough and shortness of breath.    Cardiovascular: Negative for chest pain, palpitations and peripheral edema.   Gastrointestinal: Negative for abdominal pain, constipation, diarrhea, heartburn, hematochezia and nausea.   Breasts:  Negative for tenderness, breast mass and discharge.   Genitourinary: Negative for dysuria, frequency, genital sores, hematuria, pelvic pain, urgency, vaginal bleeding and vaginal discharge.   Musculoskeletal: Negative for arthralgias, joint swelling and myalgias.   Skin: Negative for rash.   Neurological: Negative for dizziness, headaches and paresthesias.   Psychiatric/Behavioral: Negative for mood changes. The patient is not nervous/anxious.      Doing well, still taking iron twice daily. Last ferritin level was normal on this current dose of iron.   Periods pretty regular.   Her BP was elevated on arrival today, thinks she may be a little dehydrated from workout today. Previously normal, has not been checking outside of clinic.   She does get 90 minutes of cardiovascular exercise about 4 days per week.      OBJECTIVE:   BP (!) 148/90   Pulse 88   Temp 97.5  F (36.4  C) (Temporal)   Ht 1.537 m (5' 0.5\")   Wt 76.3 kg (168 lb 2 oz)   LMP 04/18/2023   SpO2 97%   BMI 32.29 kg/m    Physical Exam  GENERAL: healthy, alert and no distress  EYES: Eyes grossly normal to inspection, PERRL and conjunctivae and sclerae normal  HENT: ear canals and TM's normal, nose and mouth without " ulcers or lesions  NECK: no adenopathy, no asymmetry, masses, or scars and thyroid normal to palpation, no bruits.   RESP: lungs clear to auscultation - no rales, rhonchi or wheezes  BREAST: normal without masses, tenderness or nipple discharge and no palpable axillary masses or adenopathy  CV: regular rate and rhythm, normal S1 S2, no S3 or S4, no murmur, click or rub, no peripheral edema and peripheral pulses strong  ABDOMEN: soft, nontender, no hepatosplenomegaly, no masses and bowel sounds normal   (female): Vaginal exam reveals normal external and internal genitalia.  Cervix is closed, long and thick.  No lesions or abnormalities seen.  Pap co-test collected. Bimanual exam reveals a nontender, nongravid uterus with no CMT.  No adnexal masses or tenderness.     MS: no gross musculoskeletal defects noted, no edema  SKIN: no suspicious lesions or rashes  NEURO: Normal strength and tone, mentation intact and speech normal  PSYCH: mentation appears normal, affect normal/bright    Orders Placed This Encounter   Procedures     REVIEW OF HEALTH MAINTENANCE PROTOCOL ORDERS     REVIEW OF HEALTH MAINTENANCE PROTOCOL ORDERS     TDAP VACCINE (Adacel, Boostrix)     Lipid panel reflex to direct LDL Non-fasting        ASSESSMENT/PLAN:       ICD-10-CM    1. Routine general medical examination at a health care facility  Z00.00 REVIEW OF HEALTH MAINTENANCE PROTOCOL ORDERS      2. Menorrhagia with regular cycle  N92.0 ferrous gluconate (FERGON) 324 (38 Fe) MG tablet      3. Screen for colon cancer  Z12.11 COLOGUARD([employer] SCIENCES)      4. Cervical cancer screening  Z12.4 Pap Screen with HPV - recommended age 30 - 65 years      5. CARDIOVASCULAR SCREENING; LDL GOAL LESS THAN 160  Z13.6 Lipid panel reflex to direct LDL Non-fasting      6. Class 1 obesity due to excess calories without serious comorbidity with body mass index (BMI) of 31.0 to 31.9 in adult  E66.09     Z68.31       7. Elevated blood pressure reading without  "diagnosis of hypertension  R03.0           Patient has been advised of split billing requirements and indicates understanding: Yes but NA for today.   I recommend a yearly physical, monthly self breast exam, yearly mammogram, Pap smear every 3 years if normal or per ACOG guidelines.    I recommend weight loss and starting to monitor her blood pressure as today is higher than in the past and she has no previous diagnosis of hypertension.     See lab orders, will notify with results.     COUNSELING:  Reviewed preventive health counseling, as reflected in patient instructions  Special attention given to:        Regular exercise       Healthy diet/nutrition       Vision screening       Immunizations    Vaccinated for: TDAP    She has previously completed Hep Series through occupational health for her job.              Osteoporosis prevention/bone health -she is taking collagen.        Colorectal Cancer Screening - discussed options and cologuard ordered.        Consider Hep C screening for all patients one time for ages 18-79 years - declined.       BMI:   Estimated body mass index is 32.29 kg/m  as calculated from the following:    Height as of this encounter: 1.537 m (5' 0.5\").    Weight as of this encounter: 76.3 kg (168 lb 2 oz).   Weight management plan: Discussed healthy diet and exercise guidelines      She reports that she has never smoked. She has never used smokeless tobacco.          Tamara Griffin MD  [facility]  "

## 2023-05-02 NOTE — PROGRESS NOTES
Prior to immunization administration, verified patients identity using patient s name and date of birth. Please see Immunization Activity for additional information.     Screening Questionnaire for Adult Immunization    Are you sick today?   No   Do you have allergies to medications, food, a vaccine component or latex?   No   Have you ever had a serious reaction after receiving a vaccination?   No   Do you have a long-term health problem with heart, lung, kidney, or metabolic disease (e.g., diabetes), asthma, a blood disorder, no spleen, complement component deficiency, a cochlear implant, or a spinal fluid leak?  Are you on long-term aspirin therapy?   No   Do you have cancer, leukemia, HIV/AIDS, or any other immune system problem?   No   Do you have a parent, brother, or sister with an immune system problem?   No   In the past 3 months, have you taken medications that affect  your immune system, such as prednisone, other steroids, or anticancer drugs; drugs for the treatment of rheumatoid arthritis, Crohn s disease, or psoriasis; or have you had radiation treatments?   No   Have you had a seizure, or a brain or other nervous system problem?   No   During the past year, have you received a transfusion of blood or blood    products, or been given immune (gamma) globulin or antiviral drug?   No   For women: Are you pregnant or is there a chance you could become       pregnant during the next month?   No   Have you received any vaccinations in the past 4 weeks?   No     Immunization questionnaire answers were all negative.      Injection of tdap given by Tita Weber MA. Patient instructed to remain in clinic for 15 minutes afterwards, and to report any adverse reactions.     Screening performed by Tita Weber MA on 5/2/2023 at 3:11 PM.

## 2023-05-05 LAB
BKR LAB AP GYN ADEQUACY: NORMAL
BKR LAB AP GYN INTERPRETATION: NORMAL
BKR LAB AP HPV REFLEX: NORMAL
BKR LAB AP PREVIOUS ABNORMAL: NORMAL
PATH REPORT.COMMENTS IMP SPEC: NORMAL
PATH REPORT.COMMENTS IMP SPEC: NORMAL
PATH REPORT.RELEVANT HX SPEC: NORMAL

## 2023-05-08 LAB
HUMAN PAPILLOMA VIRUS 16 DNA: NEGATIVE
HUMAN PAPILLOMA VIRUS 18 DNA: NEGATIVE
HUMAN PAPILLOMA VIRUS FINAL DIAGNOSIS: NORMAL
HUMAN PAPILLOMA VIRUS OTHER HR: NEGATIVE

## 2023-06-02 ENCOUNTER — HEALTH MAINTENANCE LETTER (OUTPATIENT)
Age: 48
End: 2023-06-02

## 2023-06-14 LAB — NONINV COLON CA DNA+OCC BLD SCRN STL QL: NEGATIVE

## 2023-11-14 ENCOUNTER — OFFICE VISIT (OUTPATIENT)
Dept: OPTOMETRY | Facility: CLINIC | Age: 48
End: 2023-11-14
Payer: COMMERCIAL

## 2023-11-14 DIAGNOSIS — H52.13 MYOPIA OF BOTH EYES: ICD-10-CM

## 2023-11-14 DIAGNOSIS — H52.4 PRESBYOPIA: ICD-10-CM

## 2023-11-14 DIAGNOSIS — H52.223 REGULAR ASTIGMATISM OF BOTH EYES: ICD-10-CM

## 2023-11-14 DIAGNOSIS — Z01.00 ROUTINE EYE EXAM: Primary | ICD-10-CM

## 2023-11-14 PROCEDURE — 92004 COMPRE OPH EXAM NEW PT 1/>: CPT | Performed by: OPTOMETRIST

## 2023-11-14 PROCEDURE — 92015 DETERMINE REFRACTIVE STATE: CPT | Performed by: OPTOMETRIST

## 2023-11-14 PROCEDURE — 92310 CONTACT LENS FITTING OU: CPT | Mod: GA | Performed by: OPTOMETRIST

## 2023-11-14 ASSESSMENT — VISUAL ACUITY
CORRECTION_TYPE: GLASSES
OD_CC+: -1
OD_CC: 20/20
OD_CC: 20/40-3
OS_CC: 20/40
OS_CC+: -2
OS_CC: 20/25
METHOD: SNELLEN - LINEAR

## 2023-11-14 ASSESSMENT — REFRACTION_CURRENTRX
OD_BRAND: ALCON AIR OPTIX PLUS HYDRAGLYDE BC 8.6, D 14.2
OS_BRAND: ALCON AIR OPTIX PLUS HYDRAGLYDE BC 8.6, D 14.2
OS_SPHERE: -8.00
OD_SPHERE: -5.25
OS_BRAND: ALCON AIR OPTIX PLUS HYDRAGLYDE BC 8.6, D 14.2
OD_BRAND: ALCON AIR OPTIX PLUS HYDRAGLYDE BC 8.6, D 14.2
OS_SPHERE: -8.00
OD_SPHERE: -5.25

## 2023-11-14 ASSESSMENT — CUP TO DISC RATIO
OS_RATIO: 0.5
OD_RATIO: 0.5

## 2023-11-14 ASSESSMENT — CONF VISUAL FIELD
METHOD: COUNTING FINGERS
OD_INFERIOR_TEMPORAL_RESTRICTION: 0
OS_INFERIOR_NASAL_RESTRICTION: 0
OS_SUPERIOR_NASAL_RESTRICTION: 0
OS_NORMAL: 1
OS_SUPERIOR_TEMPORAL_RESTRICTION: 0
OD_INFERIOR_NASAL_RESTRICTION: 0
OD_SUPERIOR_NASAL_RESTRICTION: 0
OD_NORMAL: 1
OD_SUPERIOR_TEMPORAL_RESTRICTION: 0
OS_INFERIOR_TEMPORAL_RESTRICTION: 0

## 2023-11-14 ASSESSMENT — REFRACTION_WEARINGRX
OD_AXIS: 133
OD_SPHERE: -6.25
OD_CYLINDER: +0.50
OS_CYLINDER: +0.50
OS_AXIS: 020
OS_SPHERE: -9.50
SPECS_TYPE: SVL

## 2023-11-14 ASSESSMENT — REFRACTION_MANIFEST
OS_SPHERE: -9.50
OD_CYLINDER: -0.50
OS_SPHERE: -9.75
OS_ADD: +1.50
OS_AXIS: 008
OD_AXIS: 060
OD_SPHERE: -6.00
OS_CYLINDER: -0.50
OD_SPHERE: -5.50
OS_CYLINDER: +1.00
OD_ADD: +1.50
OS_AXIS: 110

## 2023-11-14 ASSESSMENT — SLIT LAMP EXAM - LIDS
COMMENTS: NORMAL
COMMENTS: NORMAL

## 2023-11-14 ASSESSMENT — KERATOMETRY
OD_K2POWER_DIOPTERS: 46.75
OS_AXISANGLE2_DEGREES: 42
OS_K2POWER_DIOPTERS: 47.00
OS_K1POWER_DIOPTERS: 47.50
OD_K1POWER_DIOPTERS: 46.75
OD_AXISANGLE2_DEGREES: 180

## 2023-11-14 ASSESSMENT — EXTERNAL EXAM - RIGHT EYE: OD_EXAM: NORMAL

## 2023-11-14 ASSESSMENT — TONOMETRY
OS_IOP_MMHG: 18
OD_IOP_MMHG: 18
IOP_METHOD: APPLANATION

## 2023-11-14 ASSESSMENT — EXTERNAL EXAM - LEFT EYE: OS_EXAM: NORMAL

## 2023-11-14 NOTE — PROGRESS NOTES
Chief Complaint   Patient presents with    COMPREHENSIVE EYE EXAM    Contact Lens Re-fitting        Previous contact lens wearer? Yes: Air Optix Plus Hydraglyde   Comfort of contact lenses :Good   Satisfied with current lenses: Yes        Last Eye Exam: 2 years ago   Dilated Previously: Yes    What are you currently using to see?  contacts and glasses for back up     Distance Vision Acuity: Satisfied with vision    Near Vision Acuity: Satisfied with vision while reading and using computer with glasses or contacts, low light is getting to be a problem    Needs enough ligyt to see up close     Eye Comfort: good, has allergies currently, fall   Do you use eye drops? : Yes: Uses Visine for red out, sometimes get a generic   Occupation or Hobbies: Respitory Therapist       Kristi Gutiérrez Optometric Assistant      Medical, surgical and family histories reviewed and updated 11/14/2023.       OBJECTIVE: See Ophthalmology exam    ASSESSMENT:    ICD-10-CM    1. Routine eye exam  Z01.00       2. Presbyopia  H52.4          PLAN:     There are no Patient Instructions on file for this visit.

## 2023-11-14 NOTE — LETTER
11/14/2023         RE: Kristina K Riedel  [address]  [place] [address]        Dear Colleague,    Thank you for referring your patient, Kristina K Riedel, to the [facility]. Please see a copy of my visit note below.    Chief Complaint   Patient presents with     COMPREHENSIVE EYE EXAM     Contact Lens Re-fitting        Previous contact lens wearer? Yes: Air Optix Plus Hydraglyde   Comfort of contact lenses :Good   Satisfied with current lenses: Yes        Last Eye Exam: 2 years ago   Dilated Previously: Yes    What are you currently using to see?  contacts and glasses for back up     Distance Vision Acuity: Satisfied with vision    Near Vision Acuity: Satisfied with vision while reading and using computer with glasses or contacts, low light is getting to be a problem    Needs enough ligyt to see up close     Eye Comfort: good, has allergies currently, fall   Do you use eye drops? : Yes: Uses Visine for red out, sometimes get a generic   Occupation or Hobbies: Respitory Therapist       Kristi kenxus Optometric Assistant      Medical, surgical and family histories reviewed and updated 11/14/2023.       OBJECTIVE: See Ophthalmology exam    ASSESSMENT:    ICD-10-CM    1. Routine eye exam  Z01.00       2. Presbyopia  H52.4          PLAN:     There are no Patient Instructions on file for this visit.                       Again, thank you for allowing me to participate in the care of your patient.        Sincerely,        Anila Oleary, OD

## 2023-11-14 NOTE — PATIENT INSTRUCTIONS
Optional to fill new glasses prescription  Contact lenses prescription released to patient today.  Test new trials one week, then all right to order supply.  Return for contact lenses check appointment as needed if any vision or comfort concerns  Return in 1-2 years for eye exam    Anila Oleary, OD  [phone]

## 2024-03-06 ENCOUNTER — PATIENT OUTREACH (OUTPATIENT)
Dept: CARE COORDINATION | Facility: CLINIC | Age: 49
End: 2024-03-06
Payer: COMMERCIAL

## 2024-04-02 ENCOUNTER — PATIENT OUTREACH (OUTPATIENT)
Dept: CARE COORDINATION | Facility: CLINIC | Age: 49
End: 2024-04-02
Payer: COMMERCIAL

## 2024-04-16 ENCOUNTER — PATIENT OUTREACH (OUTPATIENT)
Dept: CARE COORDINATION | Facility: CLINIC | Age: 49
End: 2024-04-16
Payer: COMMERCIAL

## 2024-06-23 ENCOUNTER — HEALTH MAINTENANCE LETTER (OUTPATIENT)
Age: 49
End: 2024-06-23

## 2025-02-14 PROBLEM — I10 STAGE 1 HYPERTENSION: Status: ACTIVE | Noted: 2025-02-14

## 2025-02-14 PROBLEM — N92.1 MENORRHAGIA WITH IRREGULAR CYCLE: Status: ACTIVE | Noted: 2023-05-02

## 2025-02-26 ENCOUNTER — HOSPITAL ENCOUNTER (OUTPATIENT)
Dept: MAMMOGRAPHY | Facility: CLINIC | Age: 50
Discharge: HOME | End: 2025-02-26
Attending: PHYSICIAN ASSISTANT
Payer: COMMERCIAL

## 2025-02-26 DIAGNOSIS — Z12.31 VISIT FOR SCREENING MAMMOGRAM: ICD-10-CM

## 2025-02-26 PROCEDURE — 77063 BREAST TOMOSYNTHESIS BI: CPT

## 2025-04-19 ENCOUNTER — E-VISIT (OUTPATIENT)
Dept: URGENT CARE | Facility: CLINIC | Age: 50
End: 2025-04-19
Payer: COMMERCIAL

## 2025-04-19 DIAGNOSIS — R30.0 DYSURIA: Primary | ICD-10-CM

## 2025-04-19 DIAGNOSIS — N30.90 BLADDER INFECTION: Primary | ICD-10-CM

## 2025-04-19 RX ORDER — NITROFURANTOIN 25; 75 MG/1; MG/1
100 CAPSULE ORAL 2 TIMES DAILY
Qty: 10 CAPSULE | Refills: 0 | Status: SHIPPED | OUTPATIENT
Start: 2025-04-19 | End: 2025-04-24

## 2025-04-19 NOTE — PROGRESS NOTES
1. Bladder infection (Primary)    - nitroFURantoin macrocrystal-monohydrate (MACROBID) 100 MG capsule; Take 1 capsule (100 mg) by mouth 2 times daily for 5 days.  Dispense: 10 capsule; Refill: 0

## 2025-04-19 NOTE — PATIENT INSTRUCTIONS
Dear Kristina K Riedel,     After reviewing your responses, I would like you to come in for a urine test to make sure we treat you correctly. This urine test is to evaluate you for a possible urinary tract infection, and should be scheduled for today or tomorrow. Schedule a Lab Only appointment here.     Lab appointments are not available at most locations on the weekends. If no Lab Only appointment is available, you should be seen in any of our convenient Walk-in or Urgent Care Centers, which can be found on our website here.     You will receive instructions with your results in [employer] once they are available.     If your symptoms worsen, you develop pain in your back or stomach, develop fevers, or are not improving in 5 days, please contact your primary care provider for an appointment or visit a Walk-in or Urgent Care Center to be seen.     Thanks again for choosing us as your health care partner,     Ely De Leon PA-C

## 2025-07-17 ENCOUNTER — E-VISIT (OUTPATIENT)
Dept: URGENT CARE | Facility: CLINIC | Age: 50
End: 2025-07-17
Payer: COMMERCIAL

## 2025-07-17 DIAGNOSIS — J01.90 ACUTE SINUSITIS WITH SYMPTOMS > 10 DAYS: Primary | ICD-10-CM

## 2025-07-17 NOTE — PATIENT INSTRUCTIONS
Dear Kristina K Riedel    After reviewing your responses, I've been able to diagnose you with Acute sinusitis with symptoms > 10 days.      Based on your responses and diagnosis, I have prescribed   Orders Placed This Encounter   Medications     amoxicillin-clavulanate (AUGMENTIN) 875-125 MG tablet     Sig: Take 1 tablet by mouth 2 times daily for 7 days.     Dispense:  14 tablet     Refill:  0    to treat your symptoms. I have sent this to your pharmacy.?     It is also important to stay well hydrated, get lots of rest and take over-the-counter decongestants,?tylenol?or ibuprofen if you?are able to?take those medications per your primary care provider to help relieve discomfort.?     It is important that you take?all of?your prescribed medication even if your symptoms are improving after a few doses.? Taking?all of?your medicine helps prevent the symptoms from returning.?     If your symptoms worsen, you develop severe headache, vomiting, high fever (>102), or are not improving in 7 days, please contact your primary care provider for an appointment or visit any of our convenient Walk-in Care or Urgent Care Centers to be seen which can be found on our website?here.?     Thanks again for choosing?us?as your health care partner,?   ?  Pamela Villar PA-C?

## 2025-08-28 ENCOUNTER — OFFICE VISIT (OUTPATIENT)
Dept: OPTOMETRY | Facility: CLINIC | Age: 50
End: 2025-08-28
Payer: COMMERCIAL

## 2025-08-28 DIAGNOSIS — H52.13 MYOPIA OF BOTH EYES: ICD-10-CM

## 2025-08-28 DIAGNOSIS — H52.223 REGULAR ASTIGMATISM OF BOTH EYES: ICD-10-CM

## 2025-08-28 DIAGNOSIS — H52.4 PRESBYOPIA: ICD-10-CM

## 2025-08-28 DIAGNOSIS — Z01.00 ROUTINE EYE EXAM: Primary | ICD-10-CM

## 2025-08-28 ASSESSMENT — CONF VISUAL FIELD
METHOD: COUNTING FINGERS
OS_SUPERIOR_TEMPORAL_RESTRICTION: 0
OS_SUPERIOR_NASAL_RESTRICTION: 0
OS_INFERIOR_NASAL_RESTRICTION: 0
OS_NORMAL: 1
OD_INFERIOR_NASAL_RESTRICTION: 0
OD_NORMAL: 1
OD_SUPERIOR_NASAL_RESTRICTION: 0
OS_INFERIOR_TEMPORAL_RESTRICTION: 0
OD_INFERIOR_TEMPORAL_RESTRICTION: 0
OD_SUPERIOR_TEMPORAL_RESTRICTION: 0

## 2025-08-28 ASSESSMENT — REFRACTION_MANIFEST
OD_CYLINDER: +0.50
OS_AXIS: 055
OD_ADD: +1.50
OD_AXIS: 060
OD_SPHERE: -6.25
OS_ADD: +1.50
OD_AXIS: 169
OS_SPHERE: -10.50
OS_CYLINDER: -0.50
METHOD_AUTOREFRACTION: 1
OS_AXIS: 110
OD_SPHERE: -5.75
OS_CYLINDER: +1.00
OS_SPHERE: -9.75
OD_CYLINDER: -0.50

## 2025-08-28 ASSESSMENT — CUP TO DISC RATIO
OS_RATIO: 0.5
OD_RATIO: 0.5

## 2025-08-28 ASSESSMENT — VISUAL ACUITY
OD_CC: 20/20
OD_CC: 20/70
OS_CC: 20/40-2
METHOD: SNELLEN - LINEAR
OS_CC+: -1
OS_CC: 20/30
CORRECTION_TYPE: CONTACTS

## 2025-08-28 ASSESSMENT — REFRACTION_CURRENTRX
OD_BRAND: ALCON AIR OPTIX PLUS HYDRAGLYDE BC 8.6, D 14.2
OS_SPHERE: -8.00
OS_BRAND: ALCON AIR OPTIX PLUS HYDRAGLYDE BC 8.6, D 14.2
OD_SPHERE: -5.25

## 2025-08-28 ASSESSMENT — REFRACTION_WEARINGRX
OS_AXIS: 020
OS_SPHERE: -10.00
SPECS_TYPE: SVL
OS_CYLINDER: +0.50
OD_SPHERE: -6.00
OD_CYLINDER: +0.50
OD_AXIS: 150

## 2025-08-28 ASSESSMENT — SLIT LAMP EXAM - LIDS
COMMENTS: NORMAL
COMMENTS: NORMAL

## 2025-08-28 ASSESSMENT — EXTERNAL EXAM - LEFT EYE: OS_EXAM: NORMAL

## 2025-08-28 ASSESSMENT — TONOMETRY
IOP_METHOD: APPLANATION
OS_IOP_MMHG: 15
OD_IOP_MMHG: 15

## 2025-08-28 ASSESSMENT — EXTERNAL EXAM - RIGHT EYE: OD_EXAM: NORMAL

## (undated) DEVICE — GLOVE PROTEXIS W/NEU-THERA 6.5  2D73TE65

## (undated) DEVICE — SYR BULB IRRIG DOVER 60 ML LATEX FREE 67000

## (undated) DEVICE — BLADE KNIFE SURG 11 371111

## (undated) DEVICE — SPONGE TONSIL W/STRING MED

## (undated) DEVICE — DRAPE LAP W/ARMBOARD 29410

## (undated) DEVICE — STOCKING SLEEVE COMPRESSION CALF MED

## (undated) DEVICE — PREP CHLORAPREP 26ML TINTED ORANGE  260815

## (undated) DEVICE — SU PDS II 2-0 CT-1 27" Z339H

## (undated) DEVICE — GLOVE BIOGEL 6.5 LATEX

## (undated) DEVICE — DRSG PRIMAPORE 02X3" 7133

## (undated) DEVICE — DRSG STERI STRIP 1/2X4" R1547

## (undated) DEVICE — SU MONOCRYL 4-0 PS-2 18" UND Y496G

## (undated) DEVICE — ESU ELEC BLADE 2.75" COATED/INSULATED E1455

## (undated) DEVICE — SOL ADH LIQUID BENZOIN SWAB 0.6ML C1544

## (undated) DEVICE — SU CHROMIC 3-0 FS-2 27" 636

## (undated) DEVICE — SU PDS II 0 CT-1 36" Z346H

## (undated) DEVICE — PACK MINOR PROCEDURE CUSTOM

## (undated) RX ORDER — DEXAMETHASONE SODIUM PHOSPHATE 10 MG/ML
INJECTION INTRAMUSCULAR; INTRAVENOUS
Status: DISPENSED
Start: 2017-04-03

## (undated) RX ORDER — FENTANYL CITRATE 50 UG/ML
INJECTION, SOLUTION INTRAMUSCULAR; INTRAVENOUS
Status: DISPENSED
Start: 2017-04-03

## (undated) RX ORDER — ONDANSETRON 2 MG/ML
INJECTION INTRAMUSCULAR; INTRAVENOUS
Status: DISPENSED
Start: 2017-04-03

## (undated) RX ORDER — KETOROLAC TROMETHAMINE 30 MG/ML
INJECTION, SOLUTION INTRAMUSCULAR; INTRAVENOUS
Status: DISPENSED
Start: 2017-04-03

## (undated) RX ORDER — PROPOFOL 10 MG/ML
INJECTION, EMULSION INTRAVENOUS
Status: DISPENSED
Start: 2017-04-03

## (undated) RX ORDER — BACITRACIN ZINC 500 [USP'U]/G
OINTMENT TOPICAL
Status: DISPENSED
Start: 2017-04-03